# Patient Record
Sex: MALE | Race: BLACK OR AFRICAN AMERICAN | Employment: UNEMPLOYED | URBAN - METROPOLITAN AREA
[De-identification: names, ages, dates, MRNs, and addresses within clinical notes are randomized per-mention and may not be internally consistent; named-entity substitution may affect disease eponyms.]

---

## 2018-07-10 PROBLEM — I10 ESSENTIAL HYPERTENSION: Status: ACTIVE | Noted: 2018-07-10

## 2018-07-10 PROBLEM — R41.3 MEMORY DIFFICULTY: Status: ACTIVE | Noted: 2018-07-10

## 2018-07-10 PROBLEM — G47.33 OSA (OBSTRUCTIVE SLEEP APNEA): Status: ACTIVE | Noted: 2018-07-10

## 2018-07-10 PROBLEM — E78.00 PURE HYPERCHOLESTEROLEMIA: Status: ACTIVE | Noted: 2018-07-10

## 2020-05-19 PROBLEM — G20 MOTOR FLUCTUATIONS RELATED TO MEDICATION USE IN PARKINSON'S DISEASE (HCC): Status: ACTIVE | Noted: 2019-09-30

## 2020-05-19 PROBLEM — G20 PARKINSON DISEASE (HCC): Status: ACTIVE | Noted: 2019-02-07

## 2020-05-19 PROBLEM — T42.8X5A MOTOR FLUCTUATIONS RELATED TO MEDICATION USE IN PARKINSON'S DISEASE (HCC): Status: ACTIVE | Noted: 2019-09-30

## 2020-05-19 PROBLEM — G20.A2 MOTOR FLUCTUATIONS RELATED TO MEDICATION USE IN PARKINSON'S DISEASE (HCC): Status: ACTIVE | Noted: 2019-09-30

## 2020-05-19 PROBLEM — G20.A1 PARKINSON DISEASE: Status: ACTIVE | Noted: 2019-02-07

## 2020-05-20 ENCOUNTER — OFFICE VISIT (OUTPATIENT)
Dept: NEUROLOGY | Age: 60
End: 2020-05-20
Payer: COMMERCIAL

## 2020-05-20 VITALS
SYSTOLIC BLOOD PRESSURE: 142 MMHG | WEIGHT: 233 LBS | BODY MASS INDEX: 31.56 KG/M2 | RESPIRATION RATE: 18 BRPM | HEART RATE: 68 BPM | HEIGHT: 72 IN | DIASTOLIC BLOOD PRESSURE: 94 MMHG | TEMPERATURE: 97.1 F | OXYGEN SATURATION: 95 %

## 2020-05-20 PROCEDURE — 99205 OFFICE O/P NEW HI 60 MIN: CPT | Performed by: CLINICAL NURSE SPECIALIST

## 2020-06-11 RX ORDER — ROTIGOTINE 4 MG/24H
1 PATCH, EXTENDED RELEASE TRANSDERMAL DAILY
Qty: 28 PATCH | Refills: 0 | COMMUNITY
Start: 2020-06-11 | End: 2020-08-13

## 2020-07-29 ENCOUNTER — HOSPITAL ENCOUNTER (OUTPATIENT)
Dept: NEUROLOGY | Age: 60
Discharge: HOME | End: 2020-07-29
Payer: COMMERCIAL

## 2020-07-29 PROCEDURE — 95886 MUSC TEST DONE W/N TEST COMP: CPT

## 2020-07-29 PROCEDURE — 95886 MUSC TEST DONE W/N TEST COMP: CPT | Performed by: PHYSICAL MEDICINE & REHABILITATION

## 2020-07-29 PROCEDURE — 95912 NRV CNDJ TEST 11-12 STUDIES: CPT | Performed by: PHYSICAL MEDICINE & REHABILITATION

## 2020-07-29 PROCEDURE — 95912 NRV CNDJ TEST 11-12 STUDIES: CPT

## 2020-07-29 NOTE — PROCEDURES
[address] [facility]  [address], [address]  Phone: [phone]  Fax: [phone]      Referring Provider: JUAN F [facility]  Primary Care Physician: Marcia Alas MD  Patient Name: Kirstin Wade  Patient YOB: 1960  Gender: male  BMI: 31.6  H: 6'  W: 233lbs     7/29/2020    Description of clinical problem:   CC: foot numbness and pain     Patient presents with left worse than right foot numbness and pain. Admits to right leg weakness and back pain. Brief physical exam:   Sensory deficit No; Weakness No; Atrophy  No; Reflex abnormality Yes    Motor NCS      Nerve / Sites Lat. Amplitude Distance Velocity Temp. Amp. 1-2    ms mV cm m/s °C %   R Peroneal - EDB      Ankle 3.96 4.7 8  31.9 100      Fib head 11.61 3.3 30 39 31.9 48.7      Pop fossa 13.75 3.1 10 47 31.9 65.6   L Peroneal - EDB      Ankle 3.49 7.6 8  31.9 100      Fib head 11.88 7.6 30 36 31.9 100      Pop fossa 13.75 7.2 10 53 31.9 94.2   R Tibial - AH      Ankle 4.64 1.9 8  32 100      Pop fossa 14.06 1.9 44 47 32 103   L Tibial - AH      Ankle 4.48 6.2 8  31.7 100      Pop fossa 14.53 4.3 44 44 31.7 69.1       Sensory NCS      Nerve / Sites Peak Lat PP Amp Distance Peak Diff Velocity Temp.     ms µV cm ms m/s °C   R Superficial peroneal - Ankle      Lat leg 3.75 7.9 10  32 32   L Superficial peroneal - Ankle      Lat leg 3.80 7.9 10  32 31.9   R Sural - Ankle (Calf)      Calf 3.85 5.0 14  47 32   L Sural - Ankle (Calf)      Calf 4.11 3.9 14  53 31.7   L Medial plantar, Lateral plantar - Ankle (Medial, lateral sole)      Medial plantar Sole NR NR 14  NR 31.5      Lateral plantar Sole NR NR 14  NR 31.6       NR  31.6   R Medial plantar, Lateral plantar - Ankle (Medial, lateral sole)      Medial plantar Sole NR NR 14  NR 31.6      Lateral plantar Sole NR NR 14  NR 31.5       NR  31.5       F  Wave      Nerve F Lat M Lat F-M Lat    ms ms ms   R Peroneal - EDB 58.1 4.4 53.6   R H-reflex responses was prolonged. 5. EMG was performed with monopolar needle in multiple muscles outlined above, including the lumbar paraspinals. There were chronic neuropathic changes seen in the right L5 and S1 innervated muscles including the paraspinals on the left. There was decreased recruitment in the left medial gastrocnemius. All other muscles tested revealed normal insertional activity, without evidence of abnormal spontaneous activity. All MUAP's were of normal amplitude and duration with a full recruitment pattern. No increase in polyphasic potentials was noted. Diagnostic Interpretation: This study was abnormal.     1. 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 It is chronic in duration, moderate in severity. Prognosis for axonal lesions is poor. 2. Bilateral medial and lateral plantar responses were absent. These responses are technically difficult to obtain in individuals and may be absent in older individuals and individuals with flat feet. Correlate clinically. Recommend further work up and correlation with lumbar MRI. Previous Study: none       Follow up EMG is recommended if clinically indicated. Technologist: Cornel Cheng     Physician: Michael Li DO, [facility]   Board Certified Physical Medicine and Rehabilitation      Nerve conduction studies and electromyography were performed according to our laboratory policies and procedures which can be provided upon request. All abnormal values are identified in the table.  Laboratory normal values can also be provided upon request.       Cc: MALLY Warner MD

## 2020-08-04 ENCOUNTER — TELEPHONE (OUTPATIENT)
Dept: PHYSICAL MEDICINE AND REHAB | Age: 60
End: 2020-08-04

## 2020-08-04 NOTE — TELEPHONE ENCOUNTER
Called and spoke with Emma to inform her that she is to follow up with the physician that referred her for the EMG test.  Emma stated that he did follow up with the foot doctor and they said to call us or his PCP. Emma then stated that she called his PCP and they told him that he would follow up with the foot doctor.   Emma was informed that if they want him to be seen by Dr Charmaine Nina he would need a new referral since the original referral was just for the EMG test.

## 2020-08-04 NOTE — TELEPHONE ENCOUNTER
Pt's wife called in she stated the pt has received his report from his EMG, he sent it to his PCP, foot doctor and his neurologist, she wants to know what the next step is. She is not sure if Dr. Stephen Moran needs to see him or if he just needs to follow up with the neurologist.Emma can be reached at [phone] Please, advise. Thank you.

## 2020-08-12 NOTE — PROGRESS NOTES
Bayron Finch is a 61 y.o. right handed man    Patient was referred for a history of Parkinson's disease. States he was diagnosed 2 years ago -- found to be \"walking stiff\" by a  and went to see his Dr who referred him to [facility].   There, he was dx with idiopathic Parkinson's disease and started on Mirapex  He titrated with little effect -- in fact, it made him tired, irritable and did not seem to help his stiffness nor his tremor     They switched him to Azilect 1mg daily and he feels it is better but not much  He feels it works better than Mirapex but still stiff, and still with tremor    We tried Neupro 2mg then 4mg   He does note improvement in swinging his arms when he walks   He also notes his golf game seems better   Asking if there is something else because he wants to be better    We discussed carbidopa/levadopa -- we also discussed higher doses of Neupro     No falls   No trouble chewing or swallowing    Imaging reviewed from a few years ago     No headache  No stroke or seizure hx       Objective:   /83 (Site: Right Upper Arm, Position: Sitting, Cuff Size: Large Adult)   Pulse 76   Temp 97.9 °F (36.6 °C)   Resp 20   Ht 6' (1.829 m)   Wt 230 lb (104.3 kg)   SpO2 95%   BMI 31.19 kg/m²      General appearance: alert, appears stated age and cooperative  Head: Normocephalic, without obvious abnormality, atraumatic  Neck: limited ROM  Extremities: no cyanosis or edema  Pulses: 2+ and symmetric  Skin: no rashes or lesions     Mental Status: Alert, oriented to person place and year     Speech: clear  Language: appropriate     Mask-like facies  No Myerson's sign     Cranial Nerves:  I: smell    II: visual acuity     II: visual fields Full    II: pupils ESTELA   III,VII: ptosis None   III,IV,VI: extraocular muscles  EOMI without nystagmus - coarse saccadic pursuits    V: mastication Normal   V: facial light touch sensation     V,VII: corneal reflex  Present   VII: facial muscle function - upper     VII: facial muscle function - lower Normal   VIII: hearing Normal   IX: soft palate elevation  Normal   IX,X: gag reflex Present   XI: trapezius strength  5/5   XI: sternocleidomastoid strength 5/5   XI: neck extension strength  5/5   XII: tongue strength  Normal     Motor:  5/5 throughout  Normal bulk and tone     Marked bradykinesia  No resting tremor right hand today  Cogwheel rigidity noted right hand    No tremor with outstretched hands  No ataxic tremor     Sensory:  LT normal  Vibration normal     Coordination:   FN, FFM and CARLEY normal    Gait:  Normal   Moderate Fenelton's  Marked bradykinesia     DTR:   No reflexes    No Victoria's     Laboratory/Radiology:     Recent labs per PCP -- none to personally review    MRI Brain 2009  Unremarkable     I independently reviewed the labs and imaging studies today. Assessment:     Idiopathic Parkinson's disease   No help with Mirapex and minimal help with Azilect 1mg daily   [facility] recommended holding off with Sinemet and I agreed last appt given his age but requests to try something different given his limited response to Mirapex, Neupro and now Azilect     Plan:      Will try small dose Sinemet   Stop Neupro     Continue Azilect for now     Increase exercise discussed     RTO 2 months but will call again in 2 weeks     Garett Palma  4:17 PM  8/13/2020

## 2020-08-13 ENCOUNTER — OFFICE VISIT (OUTPATIENT)
Dept: NEUROLOGY | Age: 60
End: 2020-08-13
Payer: MEDICAID

## 2020-08-13 VITALS
RESPIRATION RATE: 20 BRPM | WEIGHT: 230 LBS | OXYGEN SATURATION: 95 % | SYSTOLIC BLOOD PRESSURE: 131 MMHG | HEIGHT: 72 IN | TEMPERATURE: 97.9 F | DIASTOLIC BLOOD PRESSURE: 83 MMHG | BODY MASS INDEX: 31.15 KG/M2 | HEART RATE: 76 BPM

## 2020-08-13 PROCEDURE — G8417 CALC BMI ABV UP PARAM F/U: HCPCS | Performed by: CLINICAL NURSE SPECIALIST

## 2020-08-13 PROCEDURE — G8427 DOCREV CUR MEDS BY ELIG CLIN: HCPCS | Performed by: CLINICAL NURSE SPECIALIST

## 2020-08-13 PROCEDURE — 1036F TOBACCO NON-USER: CPT | Performed by: CLINICAL NURSE SPECIALIST

## 2020-08-13 PROCEDURE — 99214 OFFICE O/P EST MOD 30 MIN: CPT | Performed by: CLINICAL NURSE SPECIALIST

## 2020-08-13 PROCEDURE — 3017F COLORECTAL CA SCREEN DOC REV: CPT | Performed by: CLINICAL NURSE SPECIALIST

## 2020-09-16 ENCOUNTER — TELEPHONE (OUTPATIENT)
Dept: NEUROLOGY | Age: 60
End: 2020-09-16

## 2020-09-16 NOTE — TELEPHONE ENCOUNTER
Spoke with  and notified him to hold his carbidopa-levodopa for 1 week and call office at that time with update.

## 2020-09-16 NOTE — TELEPHONE ENCOUNTER
Wife, Debra Kasper called concerned that her  has lost 15 pounds since he was starting on carbidopa-levodopa 25-100mg bid. States his appetite is normal.  Please advise.

## 2020-09-21 ENCOUNTER — TELEPHONE (OUTPATIENT)
Dept: NEUROLOGY | Age: 60
End: 2020-09-21

## 2020-09-21 NOTE — TELEPHONE ENCOUNTER
Wife called in stating that Rasagiline Mesylate co pay is $450 for Qty: 30.  Is there anything else they can try.   Electronically signed by Kimber Grant on 9/21/20 at 1:45 PM EDT

## 2020-09-22 NOTE — TELEPHONE ENCOUNTER
Weight has maintained since stopping the Sinement  mg 1 week ago, should we start up again and if possible wife is asking can he take the Neupro at a higher dose from 4 mg to 8 mg.    Please Advise

## 2020-09-23 RX ORDER — ROTIGOTINE 8 MG/24H
8 PATCH, EXTENDED RELEASE TRANSDERMAL DAILY
Qty: 30 PATCH | Refills: 1 | Status: SHIPPED
Start: 2020-09-23 | End: 2020-09-28 | Stop reason: SDUPTHER

## 2020-09-24 NOTE — TELEPHONE ENCOUNTER
Patient's wife called in asking what was going on with the patient's prescription for Neupro patches. Per [facility] we are waiting for prior authorization and we will call once we know something.

## 2020-09-25 ENCOUNTER — TELEPHONE (OUTPATIENT)
Dept: NEUROLOGY | Age: 60
End: 2020-09-25

## 2020-09-25 NOTE — TELEPHONE ENCOUNTER
Iraj Approval of Neupro 8 mg - PA# [employer] [id number] MM,  Valid 9/25/2020 - 12/25/2020.   In Media  ,Electronically signed by Mitali Lutz on 9/25/20 at 1:57 PM EDT

## 2020-09-28 ENCOUNTER — TELEPHONE (OUTPATIENT)
Dept: NEUROLOGY | Age: 60
End: 2020-09-28

## 2020-09-28 RX ORDER — ROTIGOTINE 8 MG/24H
8 PATCH, EXTENDED RELEASE TRANSDERMAL DAILY
Qty: 42 PATCH | Refills: 0 | COMMUNITY
Start: 2020-09-28 | End: 2020-11-11

## 2020-09-28 NOTE — TELEPHONE ENCOUNTER
Wife called in to clarify that patient is too stop taking Sinemet, add 8 mg of Neupro. (Samples in [facility]). Also is requesting a referral to Chiropractor for his back pain due to his Parkinson's the results were from patient's EMG.  Please Advise  Electronically signed by Jose M Christopher on 9/28/20 at 10:15 AM EDT

## 2020-10-20 RX ORDER — ROTIGOTINE 4 MG/24H
2 PATCH, EXTENDED RELEASE TRANSDERMAL DAILY
Qty: 42 PATCH | Refills: 0 | COMMUNITY
Start: 2020-10-20 | End: 2020-11-11 | Stop reason: SDUPTHER

## 2020-11-11 ENCOUNTER — OFFICE VISIT (OUTPATIENT)
Dept: NEUROLOGY | Age: 60
End: 2020-11-11
Payer: COMMERCIAL

## 2020-11-11 VITALS
HEIGHT: 72 IN | WEIGHT: 235 LBS | HEART RATE: 65 BPM | OXYGEN SATURATION: 94 % | RESPIRATION RATE: 20 BRPM | TEMPERATURE: 97.6 F | BODY MASS INDEX: 31.83 KG/M2 | DIASTOLIC BLOOD PRESSURE: 88 MMHG | SYSTOLIC BLOOD PRESSURE: 140 MMHG

## 2020-11-11 PROCEDURE — G8427 DOCREV CUR MEDS BY ELIG CLIN: HCPCS | Performed by: CLINICAL NURSE SPECIALIST

## 2020-11-11 PROCEDURE — 99214 OFFICE O/P EST MOD 30 MIN: CPT | Performed by: CLINICAL NURSE SPECIALIST

## 2020-11-11 PROCEDURE — G8484 FLU IMMUNIZE NO ADMIN: HCPCS | Performed by: CLINICAL NURSE SPECIALIST

## 2020-11-11 PROCEDURE — G8417 CALC BMI ABV UP PARAM F/U: HCPCS | Performed by: CLINICAL NURSE SPECIALIST

## 2020-11-11 PROCEDURE — 3017F COLORECTAL CA SCREEN DOC REV: CPT | Performed by: CLINICAL NURSE SPECIALIST

## 2020-11-11 PROCEDURE — 1036F TOBACCO NON-USER: CPT | Performed by: CLINICAL NURSE SPECIALIST

## 2020-11-11 RX ORDER — ROTIGOTINE 4 MG/24H
2 PATCH, EXTENDED RELEASE TRANSDERMAL DAILY
Qty: 42 PATCH | Refills: 0 | Status: SHIPPED
Start: 2020-11-11 | End: 2021-01-18

## 2020-11-11 NOTE — PROGRESS NOTES
Emily Tapia is a 61 y.o. right handed man    Patient was referred for a history of Parkinson's disease. States he was diagnosed 2-3 years ago -- found to be \"walking stiff\" by a  and went to see his Dr who referred him to [facility].   There, he was dx with idiopathic Parkinson's disease and started on Mirapex  He titrated with little effect -- in fact, it made him tired, irritable and did not seem to help his stiffness nor his tremor     They switched him to Azilect 1mg daily and he felt it was better \"but not much\"  He feels it works better than Mirapex but still stiff, and still with tremor    We tried Neupro titrating to 8mg    He did note improvement in swinging his arms when he walks   He also notes his golf game seems better    At our last appt he asked if there was something better and we discussed Sinemet but were hesitant d/t his age -- he reported [facility] felt the same   But, given his s/s and complaints, we decided to try a small dose of Sinemet just with breakfast and dinner     His wife called the week after with notes of weight loss -- he was losing 5# per week   He tells me that he was not upset with this (\"Ineeded to lose weight\") but states his wife was worried   We held the Sinemet and kept him just on Neupro 8mg daily     He is here today states he remains stiff but does feel overall better since Neupro addition   He is asking to restart Sinemet but I remained hesitant d/t ill effects    He is asking for a \"boost\" in the afternoon because he is always slower in the afternoons and has a loss in sex drive     No falls   No trouble chewing or swallowing  Imaging reviewed from a few years ago     No headache  No stroke or seizure hx     No chest pain or palpitations  No SOB  No vertigo, lightheadedness or loss of consciousness  No falls, tripping or stumbling  No incontinence of bowels or bladder  No itching or bruising appreciated  No numbness, tingling or focal arm/leg weakness    ROS otherwise negative     Objective:   BP (!) 140/88 (Site: Right Upper Arm, Position: Sitting, Cuff Size: Large Adult)   Pulse 65   Temp 97.6 °F (36.4 °C)   Resp 20   Ht 6' (1.829 m)   Wt 235 lb (106.6 kg)   SpO2 94%   BMI 31.87 kg/m²      General appearance: alert, appears stated age and cooperative  Head: Normocephalic, without obvious abnormality, atraumatic  Neck: limited ROM  Extremities: no cyanosis or edema  Pulses: 2+ and symmetric  Skin: no rashes or lesions     Mental Status: Alert, oriented to person place and year     Speech: clear  Language: appropriate     Mask-like facies  No Myerson's sign     Cranial Nerves:  I: smell    II: visual acuity     II: visual fields Full    II: pupils ESTELA   III,VII: ptosis None   III,IV,VI: extraocular muscles  EOMI without nystagmus - coarse saccadic pursuits    V: mastication Normal   V: facial light touch sensation     V,VII: corneal reflex  Present   VII: facial muscle function - upper     VII: facial muscle function - lower Normal   VIII: hearing Normal   IX: soft palate elevation  Normal   IX,X: gag reflex    XI: trapezius strength  5/5   XI: sternocleidomastoid strength 5/5   XI: neck extension strength  5/5   XII: tongue strength  Normal     Motor:  5/5 throughout  Normal bulk and tone     Marked bradykinesia  No resting tremor right hand today  Cogwheel rigidity noted in elbows and wrists bilaterally     No tremor with outstretched hands  No ataxic tremor     Sensory:  LT normal  Vibration normal     Coordination:   FN, FFM and CARLEY normal    Gait:  Normal   Moderate Mesilla's  Marked bradykinesia     DTR:   No reflexes    No Victoria's     Laboratory/Radiology:     Recent labs per PCP -- none to personally review    MRI Brain 2009  Unremarkable     I independently reviewed the labs and imaging studies today.      Assessment:     Idiopathic Parkinson's disease   No help with Mirapex and minimal help with Azilect 1mg daily   [facility] recommended holding off with Sinemet and I agreed given his age but needed to try something different given his limited response to Mirapex, Neupro and Azilect     Plan:      Will try small dose Sinemet in the early afternoon to hopefully help his complaints    We did discuss Rytary as a possibility   We even discussed DBS and Sinemet pump  Which he and I remain hesitant to refer for this     Increase exercise discussed     Will follow with Dr Giancarlo Gambino for alternative ideas for tx options     Abe Chowdary  9:57 AM  11/11/2020

## 2020-11-12 ENCOUNTER — OFFICE VISIT (OUTPATIENT)
Dept: NEUROLOGY | Age: 60
End: 2020-11-12
Payer: COMMERCIAL

## 2020-11-12 VITALS
SYSTOLIC BLOOD PRESSURE: 136 MMHG | WEIGHT: 236 LBS | OXYGEN SATURATION: 100 % | HEART RATE: 71 BPM | DIASTOLIC BLOOD PRESSURE: 89 MMHG | RESPIRATION RATE: 16 BRPM | TEMPERATURE: 97.3 F | HEIGHT: 72 IN | BODY MASS INDEX: 31.97 KG/M2

## 2020-11-12 PROCEDURE — 99215 OFFICE O/P EST HI 40 MIN: CPT | Performed by: PSYCHIATRY & NEUROLOGY

## 2020-11-12 PROCEDURE — 1036F TOBACCO NON-USER: CPT | Performed by: PSYCHIATRY & NEUROLOGY

## 2020-11-12 PROCEDURE — 3017F COLORECTAL CA SCREEN DOC REV: CPT | Performed by: PSYCHIATRY & NEUROLOGY

## 2020-11-12 PROCEDURE — G8427 DOCREV CUR MEDS BY ELIG CLIN: HCPCS | Performed by: PSYCHIATRY & NEUROLOGY

## 2020-11-12 PROCEDURE — G8417 CALC BMI ABV UP PARAM F/U: HCPCS | Performed by: PSYCHIATRY & NEUROLOGY

## 2020-11-12 PROCEDURE — G8484 FLU IMMUNIZE NO ADMIN: HCPCS | Performed by: PSYCHIATRY & NEUROLOGY

## 2020-11-12 ASSESSMENT — ENCOUNTER SYMPTOMS
SHORTNESS OF BREATH: 0
TROUBLE SWALLOWING: 0
VOMITING: 0
PHOTOPHOBIA: 0
NAUSEA: 0

## 2020-11-12 NOTE — PROGRESS NOTES
hallucinations  delusions: No    Sleep:  insomnia: No  excessive daytime sleepiness: No  REM sleep behavior disorder: Yes  Restless legs or PLMS: No    Parkinson's Medications Schedule: Neupro patch, 8 mg for 24 hours. Sinemet 25/100, 1 tablet daily. Previous Parkinson's Medications: Mirapex did not help. He was on Azilect in the past however he is currently not taking it for unclear reasons.     CT head 11/2019: No acute abnormality    PAST MEDICAL HISTORY:   Past Medical History:   Diagnosis Date    Hypertension     REM behavioral disorder      PAST SURGICAL HISTORY:   Past Surgical History:   Procedure Laterality Date    FOOT SURGERY Bilateral      FAMILY MEDICAL HISTORY:   Family History   Problem Relation Age of Onset    Hypertension Mother     Heart Disease Father      SOCIAL HISTORY:   Social History     Socioeconomic History    Marital status:      Spouse name: None    Number of children: None    Years of education: None    Highest education level: None   Occupational History    Occupation:    Social Needs    Financial resource strain: None    Food insecurity     Worry: None     Inability: None    Transportation needs     Medical: None     Non-medical: None   Tobacco Use    Smoking status: Never Smoker    Smokeless tobacco: Never Used   Substance and Sexual Activity    Alcohol use: No    Drug use: No    Sexual activity: Not Currently   Lifestyle    Physical activity     Days per week: None     Minutes per session: None    Stress: None   Relationships    Social connections     Talks on phone: None     Gets together: None     Attends [employer] service: None     Active member of club or organization: None     Attends meetings of clubs or organizations: None     Relationship status: None    Intimate partner violence     Fear of current or ex partner: None     Emotionally abused: None     Physically abused: None     Forced sexual activity: None   Other Topics Concern  None   Social History Narrative    None      E-Cigarettes Vaping or Juuling     Questions Responses    Vaping Use Never User    Start Date     Does device contain nicotine? Never    Quit Date     Vaping Type          Allergy: No Known Allergies  MEDS:   Current Outpatient Medications:     carbidopa-levodopa (SINEMET)  MG per tablet, Take 1 tablet by mouth daily Once daily at noon, Disp: 30 tablet, Rfl: 2    rotigotine (NEUPRO) 4 MG/24HR, Place 2 patches onto the skin daily, Disp: 42 patch, Rfl: 0    meloxicam (MOBIC) 15 MG tablet, Take 15 mg by mouth daily, Disp: , Rfl:     rasagiline mesylate 1 MG TABS, Take 1 mg by mouth daily, Disp: , Rfl:     hydrochlorothiazide (HYDRODIURIL) 25 MG tablet, TK 1 T PO QD, Disp: , Rfl: 1    metoprolol tartrate (LOPRESSOR) 25 MG tablet, TK 1 T PO QD, Disp: , Rfl: 5    simvastatin (ZOCOR) 40 MG tablet, TK 1 T PO D, Disp: , Rfl: 1    REVIEW OF SYSTEMS  Review of Systems   Constitutional: Negative for appetite change, fatigue and unexpected weight change. HENT: Negative for drooling, hearing loss, tinnitus and trouble swallowing. Eyes: Negative for photophobia and visual disturbance. Respiratory: Negative for shortness of breath. Cardiovascular: Negative for palpitations. Gastrointestinal: Negative for nausea and vomiting. Endocrine: Negative for polyuria. Genitourinary: Negative for flank pain. Musculoskeletal: Positive for gait problem. Negative for neck pain and neck stiffness. Skin: Negative for rash. Allergic/Immunologic: Negative for food allergies. Neurological: Negative for dizziness, tremors, seizures, syncope, speech difficulty, weakness, light-headedness, numbness and headaches. Hematological: Negative for adenopathy. Psychiatric/Behavioral: Negative for agitation, behavioral problems and sleep disturbance.          PHYSICAL EXAM:   /89   Pulse 71   Temp 97.3 °F (36.3 °C) (Temporal)   Resp 16   Ht 6' (1.829 m)   Wt 236 lb (107 kg)   SpO2 100%   BMI 32.01 kg/m²   GENERAL APPEARANCE: Alert, well-developed, well-nourished male in no acute distress. HEENT: Normocephalic and atraumatic. PERRL. Oropharynx unremarkable. PULM: Normal respiratory effort. No accessory muscle use. CV: RRR. ABDOMEN: Soft, nontender. EXTREMITIES: No obvious signs of vascular compromise. Pulses present. No cyanosis, clubbing or edema. SKIN: Clear; no rashes, lesions or skin breaks in exposed areas. NEURO:     Neurological examination     MENTAL STATUS: Patient awake and oriented to time, place, and person. Recent/remote memory normal. Attention span/concentration normal. Speech fluent. Good comprehension, naming, and repetition. Fund of knowledge appropriate for patient's level of education. Affect normal.    CRANIAL NERVES:  CN I: Not tested. CN II: Fundoscopic exam not performed. CN III, IV, VI: Pupils equal, round and reactive to light; extra ocular movements full and intact. CN V: Facial sensation normal.  CN VII: No facial asymmetry. CN VIII:  Hearing grossly normal bilaterally. No pathologic nystagmus or skew deviation. CN IX, X: Palate elevates symmetrically. CN XI: Shoulder shrug and chin rotation equal with intact strength. CN XII: Tongue protrusion midline. MOTOR: Normal bulk. Tone in bilateral upper and lower extremities. Strength 5/5 throughout. ABNORMAL MOVEMENTS/TREMORS: No     REFLEXES: DTRs 2+; normal and symmetric throughout. Plantar response downgoing. SENSATION: Sensation grossly intact to fine touch, pain/temperature, vibration and position. COORDINATION: Finger-to-nose and heel to shin normal for age and symmetric. Finger tapping and alternating movements normal.    STATION: Negative Romberg. GAIT: Walks with a stiff gait, decreased arm swing stooped posture.     DIAGNOSTIC TESTS:     I have personally reviewed the most recent lab results:    Sodium   Date Value Ref Range Status   12/22/2010 140 132 - 146 mmol/L Final     Potassium   Date Value Ref Range Status   12/22/2010 4.2 3.5 - 5.5 mmol/L Final     Chloride   Date Value Ref Range Status   12/22/2010 104 99 - 109 mmol/L Final     CO2   Date Value Ref Range Status   12/22/2010 28 20 - 31 mmol/L Final     BUN   Date Value Ref Range Status   12/22/2010 20 6 - 20 mg/dL Final     Creatinine   Date Value Ref Range Status   12/22/2010 1.2 0.7 - 1.3 mg/dL Final     Gfr Calculated   Date Value Ref Range Status   12/22/2010 >60 >=60 ml/mn/1.73 Final     Comment:     Chronic Kidney Disease- less than 60 ml/min/1.73 sq.m. Kidney Failure- less than 15 ml/min/1.73 sq.m. Calcium   Date Value Ref Range Status   12/22/2010 9.2 8.6 - 10.5 mg/dL Final     WBC   Date Value Ref Range Status   12/22/2010 6.8 4.5 - 11.5 E9/L Final     HGB   Date Value Ref Range Status   12/22/2010 15.2 12.5 - 16.5 g/dL Final     HCT   Date Value Ref Range Status   12/22/2010 44.6 37.0 - 54.0 % Final     Platelets   Date Value Ref Range Status   12/22/2010 169 130 - 450 E9/L Final     Total Protein   Date Value Ref Range Status   12/22/2010 7.1 5.7 - 8.2 g/dL Final     Bilirubin, Total   Date Value Ref Range Status   12/22/2010 0.5 0.3 - 1.2 mg/dL Final     Alkaline Phosphatase   Date Value Ref Range Status   12/22/2010 78 45 - 129 U/L Final     ALT   Date Value Ref Range Status   12/22/2010 33 10 - 49 U/L Final     AST   Date Value Ref Range Status   12/22/2010 25 0 - 33 U/L Final     No results found for: PTT, INR  Lab Results   Component Value Date    TRIG 325 (H) 12/22/2010    HDL 40.0 (A) 12/22/2010     No components found for: HGBA1C  No results found for: PROTEINCSF, GLUCCSF, WBCCSF    Controlled Substance Monitoring:    Acute and Chronic Pain Monitoring:   No flowsheet data found.     MEDICAL DECISION MAKING  ASSESSMENT/PLAN    Parkinson's disease     This is a 80-year-old male with symptoms of stiffness, rigidity, tremors, symptoms discovered in 2018, diagnosed with Parkinson's disease. Currently the patient still continues to be stiff in bilateral upper and lower extremities, unsteady and off balance. He has tried multiple levodopa carbidopa sparing medications in the past without much significant relief. At this time I recommend maximizing the levodopa carbidopa therapy and watch for any side effects. Currently he is taking 25/100,  once a day. Consider increasing to 3 times a day. Titrate carbidopa levodopa, short acting and long-acting forms to maximize efficacy. Okay to continue Neupro at this time. However if he has side effects consider decreasing Neupro. The patient has been off of Azilect. This can be retried if there is no response to levodopa carbidopa therapy. Follow-up with Shellie Rico    Thank you for involving me in the care of your patient. Today, I personally spent > 40 mins directly face-to-face time with the patient, of which greater than 50% was spent in patient education, counseling,about etiology management and diagnosis of Parkinson's disease. Side effects of medications including carbidopa, levodopa, Azilect, Neupro, Mirapex were discussed in detail with the patient, verbalizes understanding and agrees to it. And coordination of care as described above. Patient's current medication list, allergies, problem list and results of all previously ordered testing and scans were reviewed at today's visit.       Bruna Orta MD    [facility]  [address] [place] [address]

## 2021-01-18 ENCOUNTER — OFFICE VISIT (OUTPATIENT)
Dept: NEUROLOGY | Age: 61
End: 2021-01-18
Payer: MEDICAID

## 2021-01-18 VITALS
HEIGHT: 72 IN | SYSTOLIC BLOOD PRESSURE: 136 MMHG | DIASTOLIC BLOOD PRESSURE: 87 MMHG | BODY MASS INDEX: 31.83 KG/M2 | RESPIRATION RATE: 20 BRPM | TEMPERATURE: 97 F | OXYGEN SATURATION: 98 % | WEIGHT: 235 LBS | HEART RATE: 73 BPM

## 2021-01-18 DIAGNOSIS — G20 PARKINSON'S DISEASE (HCC): Primary | ICD-10-CM

## 2021-01-18 PROCEDURE — 3017F COLORECTAL CA SCREEN DOC REV: CPT | Performed by: CLINICAL NURSE SPECIALIST

## 2021-01-18 PROCEDURE — G8417 CALC BMI ABV UP PARAM F/U: HCPCS | Performed by: CLINICAL NURSE SPECIALIST

## 2021-01-18 PROCEDURE — G8427 DOCREV CUR MEDS BY ELIG CLIN: HCPCS | Performed by: CLINICAL NURSE SPECIALIST

## 2021-01-18 PROCEDURE — 99214 OFFICE O/P EST MOD 30 MIN: CPT | Performed by: CLINICAL NURSE SPECIALIST

## 2021-01-18 PROCEDURE — 1036F TOBACCO NON-USER: CPT | Performed by: CLINICAL NURSE SPECIALIST

## 2021-01-18 PROCEDURE — G8484 FLU IMMUNIZE NO ADMIN: HCPCS | Performed by: CLINICAL NURSE SPECIALIST

## 2021-01-18 RX ORDER — ROTIGOTINE 8 MG/24H
8 PATCH, EXTENDED RELEASE TRANSDERMAL DAILY
Qty: 30 PATCH | Refills: 5 | Status: SHIPPED
Start: 2021-01-18 | End: 2021-02-24 | Stop reason: DRUGHIGH

## 2021-01-18 NOTE — PROGRESS NOTES
Chayo Dodge is a 61 y.o. right handed man    Patient was referred for a history of Parkinson's disease. States he was diagnosed 2-3 years ago -- found to be \"walking stiff\" by a  and went to see his Dr who referred him to [facility].   There, he was dx with idiopathic Parkinson's disease and started on Mirapex  He titrated with little effect -- in fact, it made him tired, irritable and did not seem to help his stiffness nor his tremor     They switched him to Azilect 1mg daily and he felt it was better \"but not much\"  He feels it works better than Mirapex but still stiff, and still with tremor    We tried Neupro titrating to 8mg    He did note improvement in swinging his arms when he walks   He also notes his golf game seemed better    At a previous appt, he asked if there was something better and we discussed Sinemet but were hesitant d/t his age -- he reported [facility] felt the same   But, given his s/s and complaints, we decided to try a small dose of Sinemet just with breakfast and dinner     His wife called the week after with notes of weight loss -- he was losing 5# per week   He tells me that he was not upset with this (\"I needed to lose weight\") but states his wife was worried   We held the Sinemet and kept him just on Neupro 8mg daily     He asked to restart Sinemet but I remained hesitant d/t ill effects and his young age   However we did agree to try once daily and have him follow with Dr Ridge Gama for possible alternatives    He agreed with Sinemet and felt the dose should be titrated     No falls   No trouble chewing or swallowing  Imaging reviewed from a few years ago     No headache  No stroke or seizure hx     No chest pain or palpitations  No SOB  No vertigo, lightheadedness or loss of consciousness  No falls, tripping or stumbling  No incontinence of bowels or bladder  No itching or bruising appreciated  No numbness, tingling or focal arm/leg weakness    ROS otherwise negative     Objective:   BP 136/87 (Site: Right Upper Arm, Position: Sitting, Cuff Size: Medium Adult)   Pulse 73   Temp 97 °F (36.1 °C)   Resp 20   Ht 6' (1.829 m)   Wt 235 lb (106.6 kg)   SpO2 98%   BMI 31.87 kg/m²      General appearance: alert, appears stated age and cooperative  Head: Normocephalic, without obvious abnormality, atraumatic  Neck: limited ROM  Extremities: no cyanosis or edema  Pulses: 2+ and symmetric  Skin: no rashes or lesions     Mental Status: Alert, oriented to person place and year     Speech: clear  Language: appropriate     Mask-like facies  No Myerson's sign     Cranial Nerves:  I: smell    II: visual acuity     II: visual fields Full    II: pupils ESTELA   III,VII: ptosis None   III,IV,VI: extraocular muscles  EOMI without nystagmus - coarse saccadic pursuits    V: mastication Normal   V: facial light touch sensation     V,VII: corneal reflex  Present   VII: facial muscle function - upper     VII: facial muscle function - lower Normal   VIII: hearing Normal   IX: soft palate elevation  Normal   IX,X: gag reflex    XI: trapezius strength  5/5   XI: sternocleidomastoid strength 5/5   XI: neck extension strength  5/5   XII: tongue strength  Normal     Motor:  5/5 throughout  Normal bulk and tone     Marked bradykinesia  No resting tremor right hand today  Cogwheel rigidity noted in elbows and wrists bilaterally     No tremor with outstretched hands  No ataxic tremor     Sensory:  LT normal  Vibration normal     Coordination:   FN, FFM and CARLEY normal    Gait:  Normal   Moderate Aliyah's  Marked bradykinesia     DTR:   No reflexes    No Victoria's     Laboratory/Radiology:     Recent labs per PCP -- none to personally review    MRI Brain 2009  Unremarkable     I independently reviewed the labs and imaging studies today.      Assessment:     Idiopathic Parkinson's disease   No help with Mirapex and minimal help with Azilect 1mg daily   [facility] recommended holding off with Sinemet and I agreed given his age but needed to try something different given his limited response to Mirapex, Neupro and Azilect     Plan:      Will try small increase of Sinemet - am and with dinner    He will start exercising more     We did discuss Rytary as a possibility     Earlene Drake  12:14 PM  1/18/2021

## 2021-01-22 ENCOUNTER — TELEPHONE (OUTPATIENT)
Dept: NEUROLOGY | Age: 61
End: 2021-01-22

## 2021-01-22 NOTE — TELEPHONE ENCOUNTER
Iraj Approval of Neupro 8 mg - [id number] TR, Valid 1/21/2021 - 01/21/2022.   In Media  Electronically signed by Joie Mcginnis on 1/22/21 at 7:31 AM EST

## 2021-02-24 ENCOUNTER — VIRTUAL VISIT (OUTPATIENT)
Dept: NEUROLOGY | Age: 61
End: 2021-02-24
Payer: MEDICAID

## 2021-02-24 DIAGNOSIS — G20 PARKINSON'S DISEASE (HCC): Primary | ICD-10-CM

## 2021-02-24 DIAGNOSIS — N31.9 BLADDER DYSFUNCTION: ICD-10-CM

## 2021-02-24 PROCEDURE — 99443 PR PHYS/QHP TELEPHONE EVALUATION 21-30 MIN: CPT | Performed by: CLINICAL NURSE SPECIALIST

## 2021-02-24 RX ORDER — ROTIGOTINE 6 MG/24H
1 PATCH, EXTENDED RELEASE TRANSDERMAL DAILY
Qty: 70 PATCH | Refills: 0 | COMMUNITY
Start: 2021-02-24 | End: 2021-03-19 | Stop reason: DRUGHIGH

## 2021-02-24 NOTE — PROGRESS NOTES
Rachel Higginbotham was read the following message We want to confirm that, for purposes of billing, this is a virtual visit with your provider for which we will submit a claim for reimbursement with your insurance company. You will be responsible for any copays, coinsurance amounts or other amounts not covered by your insurance company. If you do not accept this, unfortunately we will not be able to schedule or proceed with a virtual visit with the provider. Do you accept? Ottoniel Agee responded Yes .

## 2021-03-09 ENCOUNTER — TELEPHONE (OUTPATIENT)
Dept: NEUROLOGY | Age: 61
End: 2021-03-09

## 2021-03-09 NOTE — TELEPHONE ENCOUNTER
Pt called stating that he went to see his Urologist and he prescribed him generic Cialis. He wanted to check with Yulia Vizcarra if that is ok.  Please advise

## 2021-03-19 RX ORDER — ROTIGOTINE 4 MG/24H
2 PATCH, EXTENDED RELEASE TRANSDERMAL DAILY
Qty: 42 PATCH | Refills: 0 | COMMUNITY
Start: 2021-03-19 | End: 2021-04-28 | Stop reason: SDUPTHER

## 2021-03-19 RX ORDER — ROTIGOTINE 4 MG/24H
1 PATCH, EXTENDED RELEASE TRANSDERMAL DAILY
Qty: 14 PATCH | Refills: 0 | COMMUNITY
Start: 2021-03-19 | End: 2021-03-19 | Stop reason: SDUPTHER

## 2021-04-14 ENCOUNTER — OFFICE VISIT (OUTPATIENT)
Dept: FAMILY MEDICINE CLINIC | Age: 61
End: 2021-04-14
Payer: COMMERCIAL

## 2021-04-14 VITALS
RESPIRATION RATE: 18 BRPM | OXYGEN SATURATION: 97 % | WEIGHT: 242 LBS | DIASTOLIC BLOOD PRESSURE: 84 MMHG | BODY MASS INDEX: 32.78 KG/M2 | TEMPERATURE: 97.8 F | SYSTOLIC BLOOD PRESSURE: 122 MMHG | HEIGHT: 72 IN | HEART RATE: 72 BPM

## 2021-04-14 DIAGNOSIS — G58.9 PINCHED NERVE: ICD-10-CM

## 2021-04-14 DIAGNOSIS — G20 PARKINSON DISEASE (HCC): ICD-10-CM

## 2021-04-14 DIAGNOSIS — R07.9 CHEST PAIN, UNSPECIFIED TYPE: Primary | ICD-10-CM

## 2021-04-14 PROCEDURE — 99204 OFFICE O/P NEW MOD 45 MIN: CPT | Performed by: FAMILY MEDICINE

## 2021-04-14 PROCEDURE — 93000 ELECTROCARDIOGRAM COMPLETE: CPT | Performed by: FAMILY MEDICINE

## 2021-04-14 PROCEDURE — G8427 DOCREV CUR MEDS BY ELIG CLIN: HCPCS | Performed by: FAMILY MEDICINE

## 2021-04-14 PROCEDURE — 3017F COLORECTAL CA SCREEN DOC REV: CPT | Performed by: FAMILY MEDICINE

## 2021-04-14 PROCEDURE — G8417 CALC BMI ABV UP PARAM F/U: HCPCS | Performed by: FAMILY MEDICINE

## 2021-04-14 PROCEDURE — 1036F TOBACCO NON-USER: CPT | Performed by: FAMILY MEDICINE

## 2021-04-14 ASSESSMENT — ENCOUNTER SYMPTOMS
GASTROINTESTINAL NEGATIVE: 1
ALLERGIC/IMMUNOLOGIC NEGATIVE: 1
BACK PAIN: 1

## 2021-04-14 ASSESSMENT — PATIENT HEALTH QUESTIONNAIRE - PHQ9
2. FEELING DOWN, DEPRESSED OR HOPELESS: 0
1. LITTLE INTEREST OR PLEASURE IN DOING THINGS: 0
SUM OF ALL RESPONSES TO PHQ QUESTIONS 1-9: 0

## 2021-04-14 NOTE — ASSESSMENT & PLAN NOTE
Onset x 1 yr. More frequent. Happens will exertion. Flat surface may be painful; incline very painful. Pain is retrosternal.  SOB with exertion. Some lightheadedness with squatting or bending over x one month. No diaphoresis, sweating. Complicated by Parkinson's. Some weakness of legs with inactivity.

## 2021-04-14 NOTE — PROGRESS NOTES
2021    Jarrod Olvera (:  1960) is a 61 y.o. male, here for evaluation of the following medical concerns:  Chief Complaint   Patient presents with    New Patient     est. pcp    Chest Pain     chest pain x few mos / feels faint when squatting down       HPI    1. Chest pain, unspecified type  Assessment & Plan: Onset x 1 yr. More frequent. Happens will exertion. Flat surface may be painful; incline very painful. Pain is retrosternal.  SOB with exertion. Some lightheadedness with squatting or bending over x one month. No diaphoresis, sweating. Complicated by Parkinson's. Some weakness of legs with inactivity. Orders:  -      [facility]  2. Parkinson disease [facility])  Assessment & Plan: Onset x 2 years. On Sinemet. Sees Dr Daniella Dennison    3. Pinched nerve  Assessment & Plan:  Pinched nerve in low back; bottom of left foot numb; seens chiropractor. No Known Allergies    Prior to Visit Medications    Medication Sig Taking? Authorizing Provider   rotigotine (NEUPRO) 4 MG/24HR Place 2 patches onto the skin daily Yes RAFA Fox   carbidopa-levodopa (SINEMET)  MG per tablet Take 1 tablet by mouth 3 times daily Yes RAFA Fox   hydrochlorothiazide (HYDRODIURIL) 25 MG tablet TK 1 T PO QD Yes Historical Provider, MD   metoprolol tartrate (LOPRESSOR) 25 MG tablet TK 1 T PO QD Yes Historical Provider, MD   simvastatin (ZOCOR) 40 MG tablet TK 1 T PO D Yes Historical Provider, MD   meloxicam (MOBIC) 15 MG tablet Take 15 mg by mouth daily  Historical Provider, MD   rasagiline mesylate 1 MG TABS Take 1 mg by mouth daily  Historical Provider, MD          There is no immunization history on file for this patient.      Past Surgical History:   Procedure Laterality Date    FOOT SURGERY Bilateral     FOOT SURGERY      LT foot torn achilles        Social History     Socioeconomic History    Marital status:      Spouse name: Not on file    Number of children: Not on file    Years of education: Not on file    Highest education level: Not on file   Occupational History    Occupation:    Social Needs    Financial resource strain: Not on file    Food insecurity     Worry: Not on file     Inability: Not on file   [employer] needs     Medical: Not on file     Non-medical: Not on file   Tobacco Use    Smoking status: Never Smoker    Smokeless tobacco: Never Used   Substance and Sexual Activity    Alcohol use: No    Drug use: No    Sexual activity: Not Currently   Lifestyle    Physical activity     Days per week: Not on file     Minutes per session: Not on file    Stress: Not on file   Relationships    Social connections     Talks on phone: Not on file     Gets together: Not on file     Attends [employer] service: Not on file     Active member of club or organization: Not on file     Attends meetings of clubs or organizations: Not on file     Relationship status: Not on file    Intimate partner violence     Fear of current or ex partner: Not on file     Emotionally abused: Not on file     Physically abused: Not on file     Forced sexual activity: Not on file   Other Topics Concern    Not on file   Social History Narrative    Not on file        Family History   Problem Relation Age of Onset    Hypertension Mother     Heart Disease Father        Review of Systems   Constitutional: Negative. HENT: Negative. Eyes:        Contacts. Respiratory:        CPAP. Cardiovascular: Positive for chest pain. Gastrointestinal: Negative. Endocrine: Negative. Genitourinary: Negative. Musculoskeletal: Positive for back pain. Skin: Negative. Allergic/Immunologic: Negative. Neurological: Positive for light-headedness. Parkinson's. Hematological: Negative. Psychiatric/Behavioral: Negative.         Vitals:    04/14/21 1334   BP: 122/84   Pulse: 72   Resp: 18   Temp: 97.8 °F (36.6 °C) TempSrc: Oral   SpO2: 97%   Weight: 242 lb (109.8 kg)   Height: 6' (1.829 m)       Estimated body mass index is 32.82 kg/m² as calculated from the following:    Height as of this encounter: 6' (1.829 m). Weight as of this encounter: 242 lb (109.8 kg). BP Readings from Last 3 Encounters:   04/14/21 122/84   01/18/21 136/87   11/12/20 136/89        Physical Exam  Constitutional:       General: He is not in acute distress. Appearance: Normal appearance. He is obese. He is not ill-appearing, toxic-appearing or diaphoretic. HENT:      Head: Normocephalic and atraumatic. Right Ear: Tympanic membrane, ear canal and external ear normal. There is no impacted cerumen. Left Ear: Tympanic membrane, ear canal and external ear normal. There is no impacted cerumen. Nose: Nose normal. No congestion or rhinorrhea. Mouth/Throat:      Mouth: Mucous membranes are moist.      Pharynx: Oropharynx is clear. No oropharyngeal exudate or posterior oropharyngeal erythema. Eyes:      General: No scleral icterus. Right eye: No discharge. Left eye: No discharge. Extraocular Movements: Extraocular movements intact. Conjunctiva/sclera: Conjunctivae normal.      Pupils: Pupils are equal, round, and reactive to light. Neck:      Musculoskeletal: Normal range of motion and neck supple. No neck rigidity or muscular tenderness. Vascular: No carotid bruit. Cardiovascular:      Rate and Rhythm: Normal rate and regular rhythm. Pulses: Normal pulses. Heart sounds: Normal heart sounds. No murmur. No friction rub. No gallop. Pulmonary:      Effort: Pulmonary effort is normal. No respiratory distress. Breath sounds: Normal breath sounds. No stridor. No wheezing, rhonchi or rales. Chest:      Chest wall: No tenderness. Abdominal:      General: Bowel sounds are normal. There is no distension. Palpations: Abdomen is soft. There is no mass. Tenderness:  There is no abdominal tenderness. There is no right CVA tenderness, left CVA tenderness, guarding or rebound. Musculoskeletal: Normal range of motion. General: No swelling, tenderness, deformity or signs of injury. Right lower leg: No edema. Left lower leg: No edema. Comments: Tremor right arm. Lymphadenopathy:      Cervical: No cervical adenopathy. Skin:     General: Skin is warm and dry. Capillary Refill: Capillary refill takes less than 2 seconds. Coloration: Skin is not jaundiced or pale. Findings: No bruising, erythema, lesion or rash. Neurological:      General: No focal deficit present. Mental Status: He is alert and oriented to person, place, and time. Mental status is at baseline. Cranial Nerves: No cranial nerve deficit. Sensory: No sensory deficit. Motor: No weakness. Coordination: Coordination normal.      Gait: Gait normal.      Deep Tendon Reflexes: Reflexes normal.   Psychiatric:         Mood and Affect: Mood normal.         Behavior: Behavior normal.         ASSESSMENT/PLAN:  Jose Cobb was seen today for new patient and chest pain. Diagnoses and all orders for this visit:    Chest pain, unspecified type  -     EKG 12 [facility]    Parkinson disease [facility])    Pinched nerve         Return in about 3 months (around 7/14/2021). An  electronic signature was used to authenticate this note.     --Jackie Skelton DO on 4/14/2021 at 2:45 PM

## 2021-04-21 ENCOUNTER — OFFICE VISIT (OUTPATIENT)
Dept: CARDIOLOGY CLINIC | Age: 61
End: 2021-04-21
Payer: MEDICAID

## 2021-04-21 VITALS
OXYGEN SATURATION: 96 % | DIASTOLIC BLOOD PRESSURE: 68 MMHG | BODY MASS INDEX: 32.7 KG/M2 | HEART RATE: 72 BPM | HEIGHT: 72 IN | RESPIRATION RATE: 20 BRPM | WEIGHT: 241.4 LBS | SYSTOLIC BLOOD PRESSURE: 132 MMHG

## 2021-04-21 DIAGNOSIS — R07.9 CHEST PAIN, UNSPECIFIED TYPE: Primary | ICD-10-CM

## 2021-04-21 PROCEDURE — G8427 DOCREV CUR MEDS BY ELIG CLIN: HCPCS | Performed by: INTERNAL MEDICINE

## 2021-04-21 PROCEDURE — 93000 ELECTROCARDIOGRAM COMPLETE: CPT | Performed by: INTERNAL MEDICINE

## 2021-04-21 PROCEDURE — 99204 OFFICE O/P NEW MOD 45 MIN: CPT | Performed by: INTERNAL MEDICINE

## 2021-04-21 PROCEDURE — G8417 CALC BMI ABV UP PARAM F/U: HCPCS | Performed by: INTERNAL MEDICINE

## 2021-04-21 PROCEDURE — 1036F TOBACCO NON-USER: CPT | Performed by: INTERNAL MEDICINE

## 2021-04-21 PROCEDURE — 3017F COLORECTAL CA SCREEN DOC REV: CPT | Performed by: INTERNAL MEDICINE

## 2021-04-21 SDOH — HEALTH STABILITY: MENTAL HEALTH: HOW MANY STANDARD DRINKS CONTAINING ALCOHOL DO YOU HAVE ON A TYPICAL DAY?: NOT ASKED

## 2021-04-21 NOTE — PROGRESS NOTES
Chief Complaint   Patient presents with    Chest Pain       Patient Active Problem List    Diagnosis Date Noted    Chest pain 04/14/2021    Pinched nerve 04/14/2021    Motor fluctuations related to medication use in Parkinson's disease ([facility] 09/30/2019    Parkinson disease ([facility] 02/07/2019    Essential hypertension 07/10/2018    GISELA (obstructive sleep apnea) 07/10/2018    Pure hypercholesterolemia 07/10/2018    Memory difficulty 07/10/2018       Current Outpatient Medications   Medication Sig Dispense Refill    rotigotine (NEUPRO) 4 MG/24HR Place 2 patches onto the skin daily 42 patch 0    carbidopa-levodopa (SINEMET)  MG per tablet Take 1 tablet by mouth 3 times daily 90 tablet 2    hydrochlorothiazide (HYDRODIURIL) 25 MG tablet TK 1 T PO QD  1    metoprolol tartrate (LOPRESSOR) 25 MG tablet TK 1 T PO QD  5    simvastatin (ZOCOR) 40 MG tablet TK 1 T PO D  1    meloxicam (MOBIC) 15 MG tablet Take 15 mg by mouth daily      rasagiline mesylate 1 MG TABS Take 1 mg by mouth daily       Current Facility-Administered Medications   Medication Dose Route Frequency Provider Last Rate Last Admin    regadenoson (LEXISCAN) injection 0.4 mg  0.4 mg Intravenous ONCE PRN Jaye Carver MD            No Known Allergies    Vitals:    04/21/21 1252   BP: 132/68   Site: Right Upper Arm   Position: Sitting   Cuff Size: Medium Adult   Pulse: 72   Resp: 20   SpO2: 96%   Weight: 241 lb 6.4 oz (109.5 kg)   Height: 6' (1.829 m)       Social History     Socioeconomic History    Marital status:      Spouse name: Not on file    Number of children: Not on file    Years of education: Not on file    Highest education level: Not on file   Occupational History    Occupation:    Social Needs    Financial resource strain: Not on file    Food insecurity     Worry: Not on file     Inability: Not on file    Transportation needs     Medical: Not on file     Non-medical: Not on file   Tobacco Use    Smoking status: Never Smoker    Smokeless tobacco: Never Used   Substance and Sexual Activity    Alcohol use: Not Currently     Comment: NONE SINCE 2016    Drug use: Never    Sexual activity: Not Currently   Lifestyle    Physical activity     Days per week: Not on file     Minutes per session: Not on file    Stress: Not on file   Relationships    Social connections     Talks on phone: Not on file     Gets together: Not on file     Attends [employer] service: Not on file     Active member of club or organization: Not on file     Attends meetings of clubs or organizations: Not on file     Relationship status: Not on file    Intimate partner violence     Fear of current or ex partner: Not on file     Emotionally abused: Not on file     Physically abused: Not on file     Forced sexual activity: Not on file   Other Topics Concern    Not on file   Social History Narrative    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 Kamla Villatoro - for cardiac evaluation. Hx of Parkinsonism and reduced activity. Dakota Guerra He complains of 6 months of nearly daily chest discomfort at rest, well localized to low sternum described as sharp pain, no assoc symptoms and denies   palpitations and syncope. He also will feel some fatigue in legs, dyspnea and chest discomfort when he walks up a grade. Review of Systems:   Heart: as above   Lungs: as above   Eyes: denies changes in vision or discharge. Ears: denies changes in hearing or pain. Nose: denies epistaxis or masses   Throat: denies sore throat or trouble swallowing. Neuro: denies numbness, tingling, tremors. Skin: denies rashes or itching.    : denies hematuria, dysuria   GI: denies vomiting, diarrhea   Psych: denies mood changed,

## 2021-04-27 ENCOUNTER — TELEPHONE (OUTPATIENT)
Dept: NEUROLOGY | Age: 61
End: 2021-04-27

## 2021-04-28 RX ORDER — ROTIGOTINE 4 MG/24H
2 PATCH, EXTENDED RELEASE TRANSDERMAL DAILY
Qty: 42 PATCH | Refills: 0 | COMMUNITY
Start: 2021-04-28 | End: 2021-06-15

## 2021-04-29 NOTE — TELEPHONE ENCOUNTER
Last Appointment:  Visit date not found  Future Appointments   Date Time Provider Jean Carlos Day   5/5/2021  9:30 AM [facility] [place]   5/5/2021  9:30 AM IGOR FORD NM RM1 SEYZ CARDIO [facility]   5/6/2021  8:00 AM RAFA Ibrahim Neuro [facility]   6/9/2021  9:00 AM RAFA Ibrahim NEURO [facility]   7/15/2021  9:30 AM Chepe Cr  [address]

## 2021-04-30 ENCOUNTER — TELEPHONE (OUTPATIENT)
Dept: CARDIOLOGY | Age: 61
End: 2021-04-30

## 2021-04-30 DIAGNOSIS — Z12.5 ENCOUNTER FOR PROSTATE CANCER SCREENING: ICD-10-CM

## 2021-04-30 DIAGNOSIS — R73.9 HYPERGLYCEMIA: ICD-10-CM

## 2021-04-30 DIAGNOSIS — E78.5 HYPERLIPIDEMIA, UNSPECIFIED HYPERLIPIDEMIA TYPE: ICD-10-CM

## 2021-04-30 DIAGNOSIS — E55.9 VITAMIN D DEFICIENCY: ICD-10-CM

## 2021-04-30 DIAGNOSIS — D64.9 ANEMIA, UNSPECIFIED TYPE: Primary | ICD-10-CM

## 2021-04-30 RX ORDER — SIMVASTATIN 40 MG
TABLET ORAL
Qty: 30 TABLET | Refills: 1 | Status: SHIPPED
Start: 2021-04-30 | End: 2021-07-19 | Stop reason: SDUPTHER

## 2021-04-30 RX ORDER — HYDROCHLOROTHIAZIDE 25 MG/1
TABLET ORAL
Qty: 30 TABLET | Refills: 1 | Status: SHIPPED
Start: 2021-04-30 | End: 2021-07-19 | Stop reason: SDUPTHER

## 2021-04-30 NOTE — TELEPHONE ENCOUNTER
4-30-21 @ 1235. Spoke with Dr. Neda Pérez regarding hi soffice note saying will do Adenosine stress test on the patient.  He said it is to be a Lexiscan stress test.  Yissel Davies RN

## 2021-05-05 ENCOUNTER — HOSPITAL ENCOUNTER (OUTPATIENT)
Dept: CARDIOLOGY | Age: 61
Discharge: HOME | End: 2021-05-05
Payer: MEDICAID

## 2021-05-05 ENCOUNTER — TELEPHONE (OUTPATIENT)
Dept: CARDIOLOGY CLINIC | Age: 61
End: 2021-05-05

## 2021-05-05 VITALS
HEART RATE: 71 BPM | RESPIRATION RATE: 16 BRPM | SYSTOLIC BLOOD PRESSURE: 122 MMHG | DIASTOLIC BLOOD PRESSURE: 86 MMHG | HEIGHT: 72 IN | WEIGHT: 233 LBS | BODY MASS INDEX: 31.56 KG/M2

## 2021-05-05 DIAGNOSIS — R07.9 CHEST PAIN, UNSPECIFIED TYPE: ICD-10-CM

## 2021-05-05 PROCEDURE — 93017 CV STRESS TEST TRACING ONLY: CPT

## 2021-05-05 PROCEDURE — A9502 TC99M TETROFOSMIN: HCPCS | Performed by: INTERNAL MEDICINE

## 2021-05-05 PROCEDURE — 2580000003 HC RX 258: Performed by: INTERNAL MEDICINE

## 2021-05-05 PROCEDURE — 78452 HT MUSCLE IMAGE SPECT MULT: CPT

## 2021-05-05 PROCEDURE — 3430000000 HC RX DIAGNOSTIC RADIOPHARMACEUTICAL: Performed by: INTERNAL MEDICINE

## 2021-05-05 PROCEDURE — 6360000002 HC RX W HCPCS: Performed by: INTERNAL MEDICINE

## 2021-05-05 RX ORDER — SODIUM CHLORIDE 0.9 % (FLUSH) 0.9 %
10 SYRINGE (ML) INJECTION PRN
Status: DISCONTINUED | OUTPATIENT
Start: 2021-05-05 | End: 2021-05-06 | Stop reason: HOSPADM

## 2021-05-05 RX ADMIN — SODIUM CHLORIDE, PRESERVATIVE FREE 10 ML: 5 INJECTION INTRAVENOUS at 10:48

## 2021-05-05 RX ADMIN — TETROFOSMIN 7.9 MILLICURIE: 0.23 INJECTION, POWDER, LYOPHILIZED, FOR SOLUTION INTRAVENOUS at 09:50

## 2021-05-05 RX ADMIN — REGADENOSON 0.4 MG: 0.08 INJECTION, SOLUTION INTRAVENOUS at 10:48

## 2021-05-05 RX ADMIN — SODIUM CHLORIDE, PRESERVATIVE FREE 10 ML: 5 INJECTION INTRAVENOUS at 10:49

## 2021-05-05 RX ADMIN — TETROFOSMIN 28.8 MILLICURIE: 0.23 INJECTION, POWDER, LYOPHILIZED, FOR SOLUTION INTRAVENOUS at 10:48

## 2021-05-05 RX ADMIN — SODIUM CHLORIDE, PRESERVATIVE FREE 10 ML: 5 INJECTION INTRAVENOUS at 09:50

## 2021-05-05 NOTE — TELEPHONE ENCOUNTER
Mercedes Dowell MD  Physician  Specialty:  Cardiology  Progress Notes  Signed  Date of Service:  5/5/2021  9:30 AM    Signed      Stress normal  Ov prn         Patients wife notified and instructed on stress test results

## 2021-05-05 NOTE — PROCEDURES
imaging in the short, vertical long, and horizontal long axis demonstrated normal homogeneous tracer distribution throughout the myocardium. The resting images are normal.     Gated SPECT left ventricular ejection fraction was calculated to be 68%, with normal myocardial thickening and wall motion. Impression:    1. ECG during the infusion did not change. 2. The myocardial perfusion imaging was normal.    3. Overall left ventricular systolic function was normal without regional wall motion abnormalities. 4. Low risk general pharmacologic nuclear stress test.    Thank you for sending your patient to this [employer].        Electronically signed by Haven Godfrey MD on 5/5/21 at 11:57 AM EDT

## 2021-05-06 ENCOUNTER — VIRTUAL VISIT (OUTPATIENT)
Dept: NEUROLOGY | Age: 61
End: 2021-05-06
Payer: MEDICAID

## 2021-05-06 DIAGNOSIS — G20 PARKINSON'S DISEASE (HCC): Primary | ICD-10-CM

## 2021-05-06 PROCEDURE — 99442 PR PHYS/QHP TELEPHONE EVALUATION 11-20 MIN: CPT | Performed by: CLINICAL NURSE SPECIALIST

## 2021-05-06 NOTE — PROGRESS NOTES
Sarah Anthony is a 61 y.o. right handed man    evaluated via telephone on 5/6/2021. Consent:  He and/or health care decision maker is aware that that he may receive a bill for this telephone service, depending on his insurance coverage, and has provided verbal consent to proceed: Yes    I affirm this is a Patient Initiated Episode with an Established Patient who has not had a related appointment within my department in the past 7 days or scheduled within the next 24 hours. The patient's identity was verified at the start of the call. Others involved in call: just pt     Total Time: minutes: 21-30 minutes    Consent:  The patient and/or health care decision maker is aware that that he may receive a bill for this telephone service, depending on his insurance coverage, and has provided verbal consent to proceed: Yes  Patient advised regarding steps to help prevent the spread of COVID-19   SOURCE - https://allison-akira.info/. html     1-Stay home except to get medical care  2-Clean your hands often for atleast 20 secnds, avoid touching: Avoid touching your eyes, nose, and mouth with unwashed hands. 3-Seek medical attention: Seek prompt medical attention if your illness is worsening (e.g., difficulty breathing). Call you doctor first.  3-Wear a facemask if you are sick   4-Cover your coughs and sneezes     Note: not billable if this call serves to triage the patient into an appointment for the relevant concern    Patient was referred for a history of Parkinson's disease. States he was diagnosed 3 years ago -- found to be \"walking stiff\" by a  and went to see his Dr who referred him to [facility].   There, he was dx with idiopathic Parkinson's disease and started on Mirapex  He titrated with little effect -- in fact, it made him tired, irritable and did not seem to help his stiffness nor his tremor     They switched him to Azilect 1mg daily and he felt it was better \"but not much\"  He feels it works better than Mirapex but still stiff, and still with tremor    We tried Neupro titrating to 8mg    He did note improvement in swinging his arms when he walks   He also notes his golf game seemed better on this medication     At a previous appt, he asked if there was something better and we discussed Sinemet but were hesitant d/t his age -- he reported [facility] felt the same   But, given his s/s and complaints, we decided to try a small dose of Sinemet just with breakfast and dinner  His wife called the week after with notes of weight loss -- he was losing 5# per week   He tells me that he was not upset with this (\"I needed to lose weight\") but states his wife was worried   We held the Sinemet and kept him just on Neupro 8mg daily     He asked to restart Sinemet but I remained hesitant d/t ill effects and his young age   However we did agree to try once daily and have him follow with Dr Brittney Liz for possible alternatives   He agreed with Sinemet and felt the dose should be titrated therefore we have been doing so    Now taking dose three to four times a day but still having issues when interviewing for employment    We did discuss trialing extra dose on interview days    now states he is having trouble with ED and now going to the bathroom   We discussed need for urology eval because though ED can be r/t PD his flow issues for urine should not be related      No falls   No trouble chewing or swallowing  Imaging reviewed from a few years ago     No headache  No stroke or seizure hx     No chest pain or palpitations  No SOB  No vertigo, lightheadedness or loss of consciousness  No falls, tripping or stumbling  No incontinence of bowels or bladder  No itching or bruising appreciated  No numbness, tingling or focal arm/leg weakness    ROS otherwise negative     Objective:     Mental Status: Alert, oriented to person place and year     Speech: clear without vocal quivering   Language: appropriate Breathing seems unlabored       (I could hear his resting tremor on the phone call)     Laboratory/Radiology:     Recent labs per PCP -- none to personally review    MRI Brain 2009  Unremarkable     I independently reviewed the labs and imaging studies today. Assessment:     Idiopathic Parkinson's disease   No help with Mirapex and minimal help with Azilect 1mg daily   [facility] recommended holding off with Sinemet and I agreed given his age but needed to try something different given his limited response to Mirapex, Neupro and Azilect     Plan:      Will continue Sinemet -TID but can take extra dose on interview days (2 at one time)   Will test it out before doing so for interviews and report back    Continue Neupro 8mg samples -- insurance refusing to cover despite failure of multiple options     We did discuss Rytary as a possibility in past     Mariella Michael  7:59 AM  5/6/2021

## 2021-05-06 NOTE — PROGRESS NOTES
Bedelia Carrel was read the following message We want to confirm that, for purposes of billing, this is a virtual visit with your provider for which we will submit a claim for reimbursement with your insurance company. You will be responsible for any copays, coinsurance amounts or other amounts not covered by your insurance company. If you do not accept this, unfortunately we will not be able to schedule or proceed with a virtual visit with the provider. Do you accept? Halle Alas responded Yes .

## 2021-06-04 LAB — PROSTATE SPECIFIC ANTIGEN: 4.81 NG/ML (ref 0–4)

## 2021-06-15 RX ORDER — ROTIGOTINE 4 MG/24H
2 PATCH, EXTENDED RELEASE TRANSDERMAL DAILY
Qty: 42 PATCH | Refills: 0 | COMMUNITY
Start: 2021-06-15 | End: 2021-11-05 | Stop reason: DRUGHIGH

## 2021-06-19 ENCOUNTER — HOSPITAL ENCOUNTER (OUTPATIENT)
Age: 61
Discharge: HOME | End: 2021-06-19
Payer: COMMERCIAL

## 2021-06-19 LAB — PROSTATE SPECIFIC ANTIGEN: 3.36 NG/ML (ref 0–4)

## 2021-06-19 PROCEDURE — 36415 COLL VENOUS BLD VENIPUNCTURE: CPT

## 2021-06-19 PROCEDURE — 84153 ASSAY OF PSA TOTAL: CPT

## 2021-07-19 DIAGNOSIS — E78.00 PURE HYPERCHOLESTEROLEMIA: ICD-10-CM

## 2021-07-19 DIAGNOSIS — I10 ESSENTIAL HYPERTENSION: Primary | ICD-10-CM

## 2021-07-19 RX ORDER — SIMVASTATIN 40 MG
TABLET ORAL
Qty: 30 TABLET | Refills: 1 | Status: SHIPPED
Start: 2021-07-19 | End: 2021-07-20 | Stop reason: SDUPTHER

## 2021-07-19 RX ORDER — HYDROCHLOROTHIAZIDE 25 MG/1
TABLET ORAL
Qty: 30 TABLET | Refills: 1 | Status: SHIPPED
Start: 2021-07-19 | End: 2021-07-20 | Stop reason: SDUPTHER

## 2021-07-20 DIAGNOSIS — E78.00 PURE HYPERCHOLESTEROLEMIA: ICD-10-CM

## 2021-07-20 DIAGNOSIS — I10 ESSENTIAL HYPERTENSION: ICD-10-CM

## 2021-07-20 RX ORDER — SIMVASTATIN 40 MG
TABLET ORAL
Qty: 30 TABLET | Refills: 1 | Status: SHIPPED
Start: 2021-07-20 | End: 2021-10-17

## 2021-07-20 RX ORDER — HYDROCHLOROTHIAZIDE 25 MG/1
TABLET ORAL
Qty: 30 TABLET | Refills: 1 | Status: SHIPPED | OUTPATIENT
Start: 2021-07-20

## 2021-09-17 ENCOUNTER — HOSPITAL ENCOUNTER (OUTPATIENT)
Dept: NEUROLOGY | Age: 61
Discharge: HOME | End: 2021-09-17
Payer: COMMERCIAL

## 2021-09-17 PROCEDURE — 95886 MUSC TEST DONE W/N TEST COMP: CPT

## 2021-09-17 PROCEDURE — 95886 MUSC TEST DONE W/N TEST COMP: CPT | Performed by: PSYCHIATRY & NEUROLOGY

## 2021-09-17 PROCEDURE — 95910 NRV CNDJ TEST 7-8 STUDIES: CPT | Performed by: PSYCHIATRY & NEUROLOGY

## 2021-09-17 PROCEDURE — 95910 NRV CNDJ TEST 7-8 STUDIES: CPT

## 2021-09-17 NOTE — PROCEDURES
[facility]  [address].  Electrodiagnostic Laboratory   [place]         Full Name: Salma Cheatham Gender: Male  MRN: [id number] YOB: 1960  Location[de-identified] [facility]      Visit Date: 9/17/2021 15:14  Age: 64 Years 1 Months Old  Examining Physician: Dr. Ileana Ramires   Referring Physician: Franny Arrington  Technician: Angeli Alicea   Height: 6 feet 0 inch  Weight: 233 lbs; BMI 31.6  Notes: Numbness & Tingling         Motor NCS      Nerve / Sites Lat. Lat Diff Amplitude Amp. 1-2 Distance Velocity Temp.    ms ms mV % cm m/s °C   R Median - APB      Wrist 3.33  12.9 100 8  32.6      Elbow 7.92 4.58 12.8 99.1 24 52 33.1   L Median - APB      Wrist 3.54  8.2 100 8  33      Elbow 8.07 4.53 7.5 92.4   33   R Ulnar - ADM      Wrist 2.81  6.7 100 8  33.6      B. Elbow 6.93 4.11 5.9 88 23 56 33.5      A. Elbow 9.06 2.14 4.8 71.8 10 47 33.3             Sensory NCS      Nerve / Sites Onset Lat Peak Lat PP Amp Distance Velocity Temp.    ms ms µV cm m/s °C   R Median - Digit II (Antidromic)      Mid Palm 1.25 2.03 15.4 7 56 33      Wrist 2.45 3.33 14.7 14 57 33   L Median - Digit II (Antidromic)      Mid Palm 1.09 1.82 14.0 7 64 32.9      Wrist 2.19 3.07 13.7 14 64 32.9   R Ulnar - Digit V (Antidromic)      Wrist 2.55 3.13 21.3 14 55 32.9   R Radial - Anatomical snuff box (Forearm)      Forearm 1.72 2.19 14.6 10 58 33.5       F  Wave      Nerve F Lat M Lat F-M Lat    ms ms ms   R Median - APB 29.8 3.1 26.7   R Ulnar - ADM 30.0 3.3 26.7   L Median - APB 29.4 3.9 25.5       EMG         EMG Summary Table     Spontaneous MUAP Recruitment   Muscle IA Fib PSW Fasc H.F. Amp Dur. PPP Pattern   R. Cervical paraspinals (mid) N None None None None N N N N   R. Cervical paraspinals (low) N None None None None N N N N   R. Deltoid N None None None None N N N N   R. Triceps brachii N None None None None N N N N   R. Biceps brachii N None None None None N N N N   R. Brachioradialis N None None None None N N N N   R.  Extensor indicis Stable gyn examination  No Pap smear return to office in 1 year  Use condoms is sexual activity returns  proprius N None None None None N N N N   R. Flexor digitorum profundus (Ulnar) N None None None None N N N N   R. Flexor pollicis longus N None None None None N N N N   R. First dorsal interosseous N None None None None N N N N   R. Abductor pollicis brevis N None None None None N N N N         Nerve conduction studies in both arms displayed no abnormalities. These findings were compared to the referential values in this laboratory, available upon request.    Monopolar needle examination of the right arm was unremarkable, displaying only resting tremor. Needle testing of the paraspinals was normal.    Electrodiagnostic examination of both arms was normal at this time. There were no peripheral neuropathies. There were no motor radiculopathies or intracanalicular lesions. Sensory radiculopathies cannot be evaluated by electrodiagnostic means. Clinically, the patient diagnosed with Parkinson's disease several years ago. He presented with diffuse numbness and tingling. A brief neurological examination, I found no sensory compromise but moderate bradykinesia and resting tremors of his left hand. His paresthesias may be related to parkinsonism alone. I first recommended increasing his carbidopa/levodopa dosing. Clinical correlation was highly advised.

## 2021-09-22 ENCOUNTER — TELEPHONE (OUTPATIENT)
Dept: NEUROLOGY | Age: 61
End: 2021-09-22

## 2021-09-22 NOTE — TELEPHONE ENCOUNTER
Patient called in and left voice message requesting a call back regarding medication and changes that Dr. Tiara Sam recommended while he was doing his EMG. Called patient and he stated that he is taking his Sinemet 25/100 QID instead of TID 7:30, 10:30 and 1:30 while at work, takes the last dose before he drives home from work and only uses the [place] patch  when he feels it is needed. He is taking Sinemet  He stated that the current dose of Sinemet is not working as well as he wants it to. He is wanting to know if his medication needs to be increased.

## 2021-09-23 NOTE — TELEPHONE ENCOUNTER
Called and left message for patient to call the office and to schedule OVE appointment with Dr. Fran Burr per Michael Part.

## 2021-09-27 NOTE — TELEPHONE ENCOUNTER
Patient's wife called in stating that Kathie Mcdowell needs refills on his Sinemet and would like to have it sent for 4 times a day while awake because that is how he is taking it now. He does have an appointment with Dr. Pham File 12/8/21 per your request. He does need medication until then. Saw order was already placed and deleted my order to Harshil Morillo.

## 2021-09-28 ENCOUNTER — TELEPHONE (OUTPATIENT)
Dept: NEUROLOGY | Age: 61
End: 2021-09-28

## 2021-09-28 NOTE — TELEPHONE ENCOUNTER
Wife called requesting samples for Neupro. She is not sure what mg of Neupro patient is taking. She states that she only has 6mg boxes right now. Per office note on 5/6/21 pateint is to be on 8mg Neupro.    Please advise

## 2021-10-14 DIAGNOSIS — E78.00 PURE HYPERCHOLESTEROLEMIA: ICD-10-CM

## 2021-10-17 RX ORDER — SIMVASTATIN 40 MG
TABLET ORAL
Qty: 30 TABLET | Refills: 1 | Status: SHIPPED | OUTPATIENT
Start: 2021-10-17

## 2021-11-05 ENCOUNTER — TELEPHONE (OUTPATIENT)
Dept: NEUROLOGY | Age: 61
End: 2021-11-05

## 2021-11-05 RX ORDER — RIMEGEPANT SULFATE 75 MG/75MG
1 TABLET, ORALLY DISINTEGRATING ORAL DAILY
Qty: 35 TABLET | Refills: 0 | COMMUNITY
Start: 2021-11-05 | End: 2021-12-08

## 2021-11-05 NOTE — TELEPHONE ENCOUNTER
Patient's wife called in stating that Sailaja Gaitan needs more patches. She is concerned because last note 9/28/21 states he should use 6 mg PRN but she said he is using 6 mg every day.  Please advise

## 2021-12-08 ENCOUNTER — OFFICE VISIT (OUTPATIENT)
Dept: NEUROLOGY | Age: 61
End: 2021-12-08
Payer: COMMERCIAL

## 2021-12-08 VITALS
WEIGHT: 235 LBS | OXYGEN SATURATION: 98 % | HEIGHT: 72 IN | BODY MASS INDEX: 31.83 KG/M2 | DIASTOLIC BLOOD PRESSURE: 70 MMHG | HEART RATE: 78 BPM | SYSTOLIC BLOOD PRESSURE: 114 MMHG | RESPIRATION RATE: 18 BRPM

## 2021-12-08 DIAGNOSIS — G20 PARKINSON'S DISEASE (HCC): Primary | ICD-10-CM

## 2021-12-08 PROCEDURE — 99215 OFFICE O/P EST HI 40 MIN: CPT | Performed by: PSYCHIATRY & NEUROLOGY

## 2021-12-08 RX ORDER — ROTIGOTINE 6 MG/24H
1 PATCH, EXTENDED RELEASE TRANSDERMAL DAILY
COMMUNITY
End: 2022-04-13 | Stop reason: SDUPTHER

## 2021-12-08 RX ORDER — CARBIDOPA AND LEVODOPA 50; 200 MG/1; MG/1
1 TABLET, EXTENDED RELEASE ORAL
Qty: 120 TABLET | Refills: 11 | Status: SHIPPED
Start: 2021-12-08 | End: 2022-04-13 | Stop reason: ALTCHOICE

## 2021-12-08 NOTE — PROGRESS NOTES
This 59-year-old right-handed [place] man was referred by my nurse practitioner for additional evaluation and management of Parkinson's disease. He was an excellent historian. His current medications were rotigotine 6 mg daily, carbidopa/levodopa 25/100 tablets every 4 hours while awake, metoprolol, hydrochlorothiazide, rasagiline and meloxicam.    His medical history was remarkable for hypertension, hyperlipidemia, obstructive sleep apnea and degenerative joint disease. He denied diabetes mellitus, strokes, seizures, lung disorders, heart disease, gastrointestinal issues, kidney disorders, cancers, skin abnormalities or depression. His only surgery was repair of a left torn Achilles tendon. He denied trauma. He was a native of [address] [place] [address], moving to the [place] in 1983 to work for [employer] . 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 He was still working as an  for another company. He was  with 2 healthy children and 5 healthy grandchildren. There were no neurological disorders in the family. His family does suffer from diabetes mellitus. He did not smoke, drink or use illicit drugs. He slept moderately well with CPAP. He ate well. He received his COVID-19 vaccinations. Review of systems was otherwise unremarkable, except as noted below. 3 years ago, a colleague observed slowing of his gait. He was also hunched over. He was unaware of this development himself, until months later. He realized he was not moving his arms while walking. 1 year ago his right hand began shaking, especially when walking. These issues were worsening. He was diagnosed with Parkinson's disease. He denied any improvement with his current medications. He was started on carbidopa/levodopa 25/100 tablets 4 times daily, with no effect. Rotigotine was more recently added, again without relief.   He had not tried higher doses or other medications. There were questionable memory deficits. His speech was now soft and slow. He denied swallowing or chewing difficulties. He was not drooling. He was not falling or tripping. He denied headaches, but minimal neck and back pains. There was no weakness, sensory loss, clumsiness, spinning sensations, seizure-like activity or loss of consciousness. He was referred by my nurse practitioner for additional management. He denied any current medical issues. Physical examination displayed stable vital signs. He was an elderly man in no acute distress, who was alert, cooperative and oriented. He was very pleasant. His skin was unremarkable, with no lymphadenopathy. Head examination was unrevealing, except for a small posterior pharyngeal aperture. His neck was rigid with cogwheeling, without thyromegaly. There were +1 carotid upstrokes without bruits; there was minimally limited range of motion of his neck in all directions. His spine displayed minimal gibbus deformity, without tenderness. His lungs were clear to auscultation. Cardiac testing proved unrevealing. His abdomen was also unremarkable. There were +1 peripheral pulses without bruits. His limbs displayed no abnormalities. Neurological examination produced an intact mental status. His speech was soft; there was no dysarthria or aphasia. I found no cognitive decline, although his responses were slow at times. Cranial nerve testing revealed a marked mask facies, with Myerson's sign. Extraocular movements were intact. There were no facial jerks, snout, hyperactive gag or suck reflex. He was markedly bradykinetic, with minimal cogwheeling in both arms and wrists. He displayed intermittent tremor of his right hand, primarily when walking. Strength was 5/5 throughout, with normal bulk. There were no other abnormal movements. Reflexes were barely elicited throughout; there were no pathological ones.   All sensory modalities were intact. Finger-to-nose and heel shin testings were performed well but slowly. Rapid alternating movements and fine finger movements were also slow. He walked slowly, without festination. He turned en bloc. Romberg's was unremarkable. Laboratory data included an unremarkable CMP and CBC. His PSA was 3.36. No imaging studies were available. This individual presents a 3-year history of slowing movements and tremors. On my examination, he displays marked bradykinesia, minimal cogwheeling, masked face, intermittent resting tremor, soft speech and Myerson's sign. These signs are indicative of Parkinson's disease. He does not suffer from atypical parkinsonism. He requires increasing doses of his parkinsonian medications. I first will increase carbidopa/levodopa to the 50/200 preparation, every 4 hours while awake. The higher dose will be substituted every 4 days for the lower one. He will report back in 3 to 4 weeks. If necessary, the 25/250 preparation will be used. Higher doses of rotigotine as well as adding entacapone may be necessary. He is stable medically. He will return in 4 months. He will report back in 3 to 4 weeks after switching to the 50/200 preparation of carbidopa/levodopa every 4 hours. He will call at any time if problems arise. I also recommended increased exercising. I spent 80 minutes with the patient with over 50 % spent in counseling and disease management. All patient issues were addressed and all questions were answered.

## 2022-03-10 ENCOUNTER — TELEPHONE (OUTPATIENT)
Dept: NEUROLOGY | Age: 62
End: 2022-03-10

## 2022-03-10 NOTE — TELEPHONE ENCOUNTER
Patient's wife notified of samples Neupro 6 mg patches at ,  Lot #[id number]  Exp 07/2023. Electronically signed by Junie Collins MA on 3/10/22 at 1:08 PM EST

## 2022-03-10 NOTE — TELEPHONE ENCOUNTER
Patient is requesting samples of Neurpro patches 6 mg.   Electronically signed by Kiersten Pat MA on 3/10/22 at 11:56 AM EST

## 2022-04-13 ENCOUNTER — OFFICE VISIT (OUTPATIENT)
Dept: NEUROLOGY | Age: 62
End: 2022-04-13
Payer: COMMERCIAL

## 2022-04-13 VITALS
SYSTOLIC BLOOD PRESSURE: 125 MMHG | RESPIRATION RATE: 18 BRPM | BODY MASS INDEX: 32.78 KG/M2 | HEIGHT: 72 IN | OXYGEN SATURATION: 97 % | TEMPERATURE: 96.1 F | DIASTOLIC BLOOD PRESSURE: 83 MMHG | WEIGHT: 242 LBS | HEART RATE: 71 BPM

## 2022-04-13 DIAGNOSIS — G20 PARKINSON'S DISEASE (HCC): Primary | ICD-10-CM

## 2022-04-13 PROCEDURE — 99215 OFFICE O/P EST HI 40 MIN: CPT | Performed by: PSYCHIATRY & NEUROLOGY

## 2022-04-13 RX ORDER — ROTIGOTINE 6 MG/24H
1 PATCH, EXTENDED RELEASE TRANSDERMAL DAILY
Qty: 42 PATCH | Refills: 0 | COMMUNITY
Start: 2022-04-13 | End: 2022-07-27

## 2022-04-13 RX ORDER — CARBIDOPA/LEVODOPA 25MG-250MG
1 TABLET ORAL
Qty: 120 TABLET | Refills: 11 | Status: SHIPPED | OUTPATIENT
Start: 2022-04-13

## 2022-04-13 NOTE — PROGRESS NOTES
This 57-year-old right-handed [place] man was referred for additional evaluation and management of Parkinson's disease. He was an excellent historian. His medications were now rotigotine 6 mg daily, carbidopa/levodopa 50/200 tablets every 4 hours while awake, metoprolol, hydrochlorothiazide and simvastatin. His medical history was remarkable for hypertension, hyperlipidemia, obstructive sleep apnea and degenerative joint disease. He again denied diabetes mellitus, strokes, seizures, lung disorders, heart disease, gastrointestinal issues or depression. Over 3 years ago, a colleague observed slowing of his gait. He was also hunched over. He was unaware of this development himself, until months later. He realized he was not moving his arms while walking. Over 1 year ago his right hand began shaking, especially when walking. These issues gradually worsened. He was diagnosed with Parkinson's disease. He noted improvement with increasing doses of carbidopa/levodopa, as well as rotigotine. However, he was still slower than normal, with occasional tremors of the right hand with walking. He worked without issues. He now denied any memory deficits. He again denied swallowing or chewing difficulties. He was not drooling. He was not falling or tripping. He denied headaches, but minimal neck and back pains. There was no weakness, sensory loss, clumsiness, seizure-like activity or loss of consciousness. He again denied any current medical issues. He was sleeping and eating moderately well. He was only exercising minimally, noting pains in both knees. These discomforts improved using topical gels. Review of systems was otherwise unremarkable. Physical examination displayed stable vital signs. Blood pressure is 125/83. He was an elderly man in no acute distress, who was alert, cooperative and oriented. He remained very pleasant. His skin was unremarkable.   Head examination displayed the small posterior pharyngeal aperture. His neck produced minimal cogwheeling, without thyromegaly. There were +1 carotid upstrokes without bruits. His lungs were clear to auscultation. Cardiac testing proved unrevealing. There were +1 peripheral pulses without bruits. His limbs displayed no abnormalities. Neurological examination produced an intact mental status. His speech was still soft; there was no dysarthria or aphasia. I found no cognitive decline; he answered more quickly today. Cranial nerve testing revealed a minimal mask facies, without Myerson's sign. Extraocular movements continued intact. He was minimally bradykinetic, with persistent cogwheeling in both arms and wrists. He again displayed intermittent tremor of his right hand, only when walking. Strength was 5/5 throughout. Reflexes were barely elicited throughout; there were no pathological ones. All sensory modalities continued intact. Finger-to-nose and heel shin testings were performed well but slowly. He walked more quickly, without festination. He turned en bloc. Romberg's was unremarkable. His examination reveals improving motor function. Laboratory data included an unremarkable CMP and CBC. His PSA was 3.36. No imaging studies were available. This individual presents a 3-year history of slowing movements and tremors. On my examination, he displays marked bradykinesia, minimal cogwheeling, masked face, intermittent resting tremor, soft speech and Myerson's sign. These signs are indicative of Parkinson's disease. He does not suffer from atypical parkinsonism. He requires increasing doses of his parkinsonian medications. I will increase carbidopa/levodopa to the 25/250 preparation, every 4 hours while awake. He will continue with rotigotine 6 mg daily for now. He is otherwise stable neurologically. Jagruti Perla He continues well medically. He will return in 4 months, to see me.   He will report back in 4 weeks after switching to the 25/250 preparation of carbidopa/levodopa every 4 hours. He will call at any time if problems arise. I again recommended increased exercising. I spent 40 minutes with the patient with over 50 % spent in counseling and disease management. All patient issues were addressed and all questions were answered.

## 2022-07-12 ENCOUNTER — TELEPHONE (OUTPATIENT)
Dept: NEUROLOGY | Age: 62
End: 2022-07-12

## 2022-07-12 NOTE — TELEPHONE ENCOUNTER
Wife called requesting Neupro patches for patient. Using 6mg daily.     Dispensed 2 boxes of 4mg 2 boxes of 2mg  Lot # [id number]  Exp 07/2023

## 2022-07-14 NOTE — TELEPHONE ENCOUNTER
Thank you. However, please let them know that we will likely be unable to obtain samples in the future.   Thank you

## 2022-07-15 ENCOUNTER — TELEPHONE (OUTPATIENT)
Dept: NEUROLOGY | Age: 62
End: 2022-07-15

## 2022-07-15 RX ORDER — ROTIGOTINE 6 MG/24H
1 PATCH, EXTENDED RELEASE TRANSDERMAL DAILY
Qty: 30 PATCH | Refills: 11
Start: 2022-07-15 | End: 2022-07-27

## 2022-08-16 ENCOUNTER — OFFICE VISIT (OUTPATIENT)
Dept: NEUROLOGY | Age: 62
End: 2022-08-16
Payer: COMMERCIAL

## 2022-08-16 VITALS
WEIGHT: 228 LBS | SYSTOLIC BLOOD PRESSURE: 128 MMHG | HEART RATE: 73 BPM | DIASTOLIC BLOOD PRESSURE: 87 MMHG | BODY MASS INDEX: 30.92 KG/M2 | OXYGEN SATURATION: 96 % | RESPIRATION RATE: 18 BRPM | TEMPERATURE: 96.1 F

## 2022-08-16 DIAGNOSIS — G20 PARKINSON'S DISEASE (HCC): Primary | ICD-10-CM

## 2022-08-16 PROCEDURE — 99215 OFFICE O/P EST HI 40 MIN: CPT | Performed by: PSYCHIATRY & NEUROLOGY

## 2022-08-16 RX ORDER — ENTACAPONE 200 MG/1
200 TABLET ORAL
Qty: 120 TABLET | Refills: 11 | Status: SHIPPED | OUTPATIENT
Start: 2022-08-16 | End: 2023-08-16

## 2022-08-16 NOTE — PROGRESS NOTES
This 58year-old right-handed [place] man was referred for evaluation and management of Parkinson's disease. He remained an excellent historian. His medications were now rotigotine 6 mg daily, carbidopa/levodopa 25/250 tablets every 4 hours while awake, metoprolol, hydrochlorothiazide and simvastatin. He was unable to afford the rotigotine patches. His medical history was remarkable for hypertension, hyperlipidemia, obstructive sleep apnea and degenerative joint disease. He again denied diabetes mellitus, strokes, seizures, lung disorders, heart disease, gastrointestinal issues or depression. 4 years ago, a colleague observed slowing of his gait. He was hunched over. He was unaware of these issues himself, until months later. He realized he was not moving his arms while walking. 2 years ago his right hand began shaking, especially when walking. These deficits gradually worsened. He was diagnosed with Parkinson's disease. He noted improvement with increasing doses of carbidopa/levodopa, as well as rotigotine. On his last visit, carbidopa/levodopa was increased to 25/250 tablets. He improved. He still noted slowing down right after taking a dose. He questioned using other medications. He again denied swallowing or chewing difficulties. He was not drooling. He was not falling or tripping. He denied headaches, but minimal neck and back pains. There was no weakness, sensory loss, clumsiness, memory loss, seizure-like activity or loss of consciousness. He denied any current medical issues. He was sleeping and eating moderately well. He was only exercising more, despite pains in both knees. These discomforts improved using topical gels. He received his COVID-19 vaccinations and boosters. Review of systems was otherwise unremarkable. Physical examination displayed stable vital signs. Blood pressure was 128/87.   He was an elderly man in no acute distress, who was alert, cooperative and oriented. He remained pleasant. His skin was unremarkable. Head examination displayed the small posterior pharyngeal aperture. His neck produced minimal cogwheeling, without thyromegaly. There were +1 carotid upstrokes without bruits. His lungs were clear to auscultation. Cardiac testing proved unrevealing. There were +1 peripheral pulses without bruits. His limbs displayed no abnormalities. Neurological examination produced an intact mental status. His speech remained soft, without dysarthria or aphasia. I found no cognitive decline. Cranial nerve testing revealed a minimal mask facies, without Myerson's sign. Extraocular movements continued intact. He was minimally bradykinetic, with minimal to moderate cogwheeling in both arms and wrists. He displayed only slight, intermittent tremor of his right hand, when walking. Strength was 5/5 throughout. Reflexes were barely elicited throughout; there were no pathological ones. All sensory modalities continued intact. Finger-to-nose and heel shin testings were performed well but slowly. He walked more quickly, without festination. He did not en bloc. Romberg's was unremarkable. His examination displayed improving motor function. Laboratory data included past unremarkable CMP and CBC. His PSA was 3.36. No imaging studies were available. This individual presents a for year history of slowing movements and tremors. His parkinsonism has improved with increasing doses of carbidopa/levodopa. Due to difficulties obtaining rotigotine patches, I will now add entacapone 200 mg to every carbidopa/levodopa dosing. He is otherwise stable neurologically. Yvette Estevez He continues well medically. He will return in 3 months, to see me. Entacapone was added, as noted above. He will call at any time if problems arise. Over an hour    I spent 40 minutes with the patient with over 50 % spent in counseling and disease management. All patient issues were addressed and all questions were answered.

## 2022-09-07 ENCOUNTER — TELEPHONE (OUTPATIENT)
Dept: NEUROLOGY | Age: 62
End: 2022-09-07

## 2022-09-07 NOTE — TELEPHONE ENCOUNTER
Patient's wife called in stating that since starting the [place], he has been getting dizzy spells and yesterday he fainted getting out of the tub and hit his head. He again felt dizzy today when he was at work. It happens multiple times daily. It has gotten worse since starting the medication. Please advise.

## 2022-09-09 NOTE — TELEPHONE ENCOUNTER
Called and left message with instructions from Dr. Oseas Wise and to call the office with any questions.

## 2022-10-04 ENCOUNTER — OFFICE VISIT (OUTPATIENT)
Dept: NEUROLOGY | Age: 62
End: 2022-10-04
Payer: COMMERCIAL

## 2022-10-04 VITALS
DIASTOLIC BLOOD PRESSURE: 90 MMHG | BODY MASS INDEX: 30.48 KG/M2 | HEIGHT: 72 IN | OXYGEN SATURATION: 97 % | RESPIRATION RATE: 18 BRPM | TEMPERATURE: 97.4 F | HEART RATE: 84 BPM | WEIGHT: 225 LBS | SYSTOLIC BLOOD PRESSURE: 137 MMHG

## 2022-10-04 DIAGNOSIS — G20 PARKINSON'S DISEASE (HCC): Primary | ICD-10-CM

## 2022-10-04 PROCEDURE — 99215 OFFICE O/P EST HI 40 MIN: CPT | Performed by: PSYCHIATRY & NEUROLOGY

## 2022-10-04 RX ORDER — ROPINIROLE 4 MG/1
4 TABLET, FILM COATED, EXTENDED RELEASE ORAL 2 TIMES DAILY
Qty: 60 TABLET | Refills: 5 | Status: SHIPPED | OUTPATIENT
Start: 2022-10-04 | End: 2023-04-02

## 2022-10-04 NOTE — PROGRESS NOTES
This 58year-old right-handed man was referred for evaluation and management of Parkinson's disease. He remained an excellent historian. He was alone. His medications were now carbidopa/levodopa 25/250 tablets every 4 hours while awake, metoprolol, hydrochlorothiazide and simvastatin. He was unable to afford the rotigotine patches and requested other medications. His medical history was remarkable for hypertension, hyperlipidemia, obstructive sleep apnea and degenerative joint disease. He again denied diabetes mellitus, strokes, seizures, lung disorders, heart disease, gastrointestinal issues or depression. Over 4 years ago, a colleague observed slowing of his gait. He was hunched over. He was unaware of these issues himself, until months later. He realized he was not moving his arms while walking. Over 2 years ago his right hand began shaking, especially when walking. These deficits gradually worsened. He was diagnosed with Parkinson's disease. He noted improvement with increasing doses of carbidopa/levodopa, as well as rotigotine. On his last visit, carbidopa/levodopa was increased to 25/250 tablets. Unfortunately, rotigotine was discontinued because as this insurance denied approval of that drug. Subsequently, he was slower. He noted carbidopa/levodopa and entacapone did not last for hours. He questioned increasing doses or other medications. He again denied chewing difficulties. He was not drooling. He was not falling or tripping. There were rare swallowing difficulties. He denied headaches, but minimal neck and back pains. There was no weakness, sensory loss, clumsiness, memory loss, seizure-like activity or loss of consciousness. He been suffering from multisystem atrophy. I informed him he did not have that disorder, which would not respond to any doses of carbidopa/levodopa or other parkinsonian medications. 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 He had to speak twice at that meeting and was quite anxious. He again denied any current medical issues. He was sleeping and eating moderately well. He was only exercising more, despite pains in both knees. These discomforts improved using topical gels. He received his COVID-19 vaccinations and boosters. Review of systems was otherwise unremarkable. Physical examination displayed stable vital signs. Blood pressure was 137/90. He was an elderly man in no acute distress, who was alert, cooperative and oriented. He remained pleasant. His skin was unremarkable. Head examination displayed the small posterior pharyngeal aperture. His neck produced minimal cogwheeling. There were +1 carotid upstrokes without bruits. His lungs were clear to auscultation. Cardiac testing proved unrevealing. There were +1 peripheral pulses without bruits. His limbs displayed no abnormalities. Neurological examination produced an intact mental status. His speech remained soft, without dysarthria or aphasia. I again found no cognitive decline. Cranial nerve testing revealed a minimal mask facies, without Myerson's sign. Extraocular movements continued intact. He was minimally bradykinetic, with slight cogwheeling in both arms and wrists. He also displayed a slight, intermittent tremor of his right hand, when walking. Strength was 5/5 throughout. Reflexes were barely elicited throughout. All sensory modalities continued intact. Finger-to-nose and heel shin testings were performed well but slowly. He walked more quickly, without festination. He did not en bloc. His examination displayed moderate improvements. There were no recent laboratory data. This individual presents a 4 year history of slowing movements and tremors. His parkinsonism has improved with increasing doses of carbidopa/levodopa. However, entacapone has been unable to maintain dosing every 4 hours.   I will first add ropinirole XL 4 mg in the morning. If necessary, an additional 4 mg will be given in the evenings. Also, for his upcoming speeches, he may take 5/50 half tablet of carbidopa/levodopa, 1 hour before his presentations. He is otherwise stable neurologically. Peter Soni He continues well medically. He will return in 6 weeks, to see me. Entacapone was maintained. Ropinirole XL was added. He will report back in 3 to 4 weeks. He will call at any time if problems arise. I spent 40 minutes with the patient with over 50 % spent in counseling and disease management. All patient issues were addressed and all questions were answered.

## 2022-10-12 NOTE — PROGRESS NOTES
"10/12/2022       RE: Bharath Mercado  [address] [facility] [place] [address]     Dear Colleague,    Thank you for referring your patient, Bharath Mercado, to the [facility] [place] at [facility]. Please see a copy of my visit note below.      Assessment & Plan     Post-COVID chronic concentration deficit  Discussed possible causes of concentration deficits with patient.  Advised on chronic fatigue with an impact on cognitive function.  Recommend neuropsychology evaluation as patient feels that his issues are somewhat impacting his work.    - Adult Neuropsychology Referral; Future    Post-COVID chronic dyspnea  Reviewed gradual increase in exercise following COVID-19.  Given some degree of dyspnea, recommend pulmonary rehab.  Referral was placed today.  - Pulmonary Rehab Referral; Future      I spent a total of 40 minutes on the day of the visit.   Time spent doing chart review, history and exam, documentation and further activities per the note       BMI:   Estimated body mass index is 28.59 kg/m  as calculated from the following:    Height as of 9/7/22: 1.803 m (5' 11\").    Weight as of 9/7/22: 93 kg (205 lb).           Return in about 4 months (around 2/12/2023).    Clarisa Melara MD  [facility] [place]    Tremaine Grace is a 57 year old, presenting for the following health issues:  Video Visit    Video Visit      HPI     Patient is following up on post-COVID issues. He reports that he is doing a little better. He has been working with PT on strengthening exercises. He states that he is able to go on longer walks, able to use a bike. He has been seen by Neurology as well. Most of the testing has been normal. Mild neuropathy was seen in his upper extremities. He has been doing OK with work. He also feels that his anxiety is under better control. " Jose M Shah is a 61 y.o. right handed man    evaluated via telephone on 2/24/2021. Consent:  He and/or health care decision maker is aware that that he may receive a bill for this telephone service, depending on his insurance coverage, and has provided verbal consent to proceed: Yes    I affirm this is a Patient Initiated Episode with an Established Patient who has not had a related appointment within my department in the past 7 days or scheduled within the next 24 hours. The patient's identity was verified at the start of the call. Others involved in call: just pt     Total Time: minutes: 21-30 minutes    Consent:  The patient and/or health care decision maker is aware that that he may receive a bill for this telephone service, depending on his insurance coverage, and has provided verbal consent to proceed: Yes  Patient advised regarding steps to help prevent the spread of COVID-19   SOURCE - [email or web address] html     1-Stay home except to get medical care  2-Clean your hands often for atleast 20 secnds, avoid touching: Avoid touching your eyes, nose, and mouth with unwashed hands. 3-Seek medical attention: Seek prompt medical attention if your illness is worsening (e.g., difficulty breathing). Call you doctor first.  3-Wear a facemask if you are sick   4-Cover your coughs and sneezes     Note: not billable if this call serves to triage the patient into an appointment for the relevant concern    Patient was referred for a history of Parkinson's disease. States he was diagnosed 2-3 years ago -- found to be \"walking stiff\" by a  and went to see his Dr who referred him to [facility].   There, he was dx with idiopathic Parkinson's disease and started on Mirapex  He titrated with little effect -- in fact, it made him tired, irritable and did not seem to help his stiffness nor his tremor     They switched him to Azilect 1mg daily and he felt it was better He has noticed significant improvement in anxiety with Lexapro. He has noticed that keeping track of some things is still difficult.         Current concerns: Health Concerns    PHQ Assesment Total Score(s) 10/11/2022   PHQ-9 Score 3   Some recent data might be hidden     VIDA-7 Results 10/11/2022   VIDA 7 TOTAL SCORE 4 (minimal anxiety)   Some recent data might be hidden     PTSD Screen Score 10/11/2022   Have you ever experienced this kind of event? Yes   PTSD Screen (Score of 3 or more suggests positive screen) 3   Some recent data might be hidden     No flowsheet data found.          Past Medical History:   Diagnosis Date     Borderline hyperlipidemia      Hernia, abdominal     inguinal     Palpitations     intermittent PSVT       Past Surgical History:   Procedure Laterality Date     HC REPAIR ING HERNIA,5+Y/O,REDUCIBL      right inguinal     VASECTOMY       ZZC APPENDECTOMY         Family History   Problem Relation Age of Onset     Cerebrovascular Disease Mother      Cancer Father 68        bladder ca/lentigo maligna     C.A.D. Father 65        coronary stents     Cerebrovascular Disease Paternal Grandmother      Diabetes Paternal Grandmother         type 2     Diabetes Paternal Aunt         type 1, childhood onset     C.A.D. Paternal Aunt      Cancer - colorectal No family hx of      Prostate Cancer No family hx of        Social History     Tobacco Use     Smoking status: Never     Smokeless tobacco: Never   Substance Use Topics     Alcohol use: Yes     Alcohol/week: 0.0 standard drinks     Comment: 2 x week     Drug use: No         Current Outpatient Medications:      escitalopram (LEXAPRO) 10 MG tablet, Take 1 tablet (10 mg) by mouth daily, Disp: 90 tablet, Rfl: 3     multivitamin, therapeutic with minerals (THERA-VIT-M) TABS, Take 1 tablet by mouth daily, Disp: , Rfl:      sildenafil (REVATIO) 20 MG tablet, Take 1 tablet (20 mg) by mouth daily as needed (erectile dysfunction), Disp: 30 tablet, Rfl:  \"but not much\"  He feels it works better than Mirapex but still stiff, and still with tremor    We tried Neupro titrating to 8mg    He did note improvement in swinging his arms when he walks   He also notes his golf game seemed better on this medication     At a previous appt, he asked if there was something better and we discussed Sinemet but were hesitant d/t his age -- he reported [facility] felt the same   But, given his s/s and complaints, we decided to try a small dose of Sinemet just with breakfast and dinner     His wife called the week after with notes of weight loss -- he was losing 5# per week   He tells me that he was not upset with this (\"I needed to lose weight\") but states his wife was worried   We held the Sinemet and kept him just on Neupro 8mg daily     He asked to restart Sinemet but I remained hesitant d/t ill effects and his young age   However we did agree to try once daily and have him follow with Dr Amelie Hadley for possible alternatives   He agreed with Sinemet and felt the dose should be titrated therefore we have been doing so    now states he is having trouble with ED and now going to the bathroom   We discussed need for urology eval because though ED can be r/t PD his flow issues for urine should not be related      No falls   No trouble chewing or swallowing  Imaging reviewed from a few years ago     No headache  No stroke or seizure hx     No chest pain or palpitations  No SOB  No vertigo, lightheadedness or loss of consciousness  No falls, tripping or stumbling  No incontinence of bowels or bladder  No itching or bruising appreciated  No numbness, tingling or focal arm/leg weakness    ROS otherwise negative     Objective:     Mental Status: Alert, oriented to person place and year     Speech: clear without vocal quivering   Language: appropriate     Breathing seems unlabored       Laboratory/Radiology:     Recent labs per PCP -- none to personally review    MRI Brain 2009  Unremarkable     I 3    Review of Systems   Constitutional: Positive for fatigue. Negative for chills and fever.   HENT: Negative for ear discharge, ear pain, hearing loss, mouth sores, nosebleeds, sinus pain, sore throat, tinnitus and trouble swallowing.    Respiratory: Positive for shortness of breath. Negative for cough and wheezing.    Endocrine: Negative for polydipsia and polyphagia.   Genitourinary: Negative for penile discharge.   Musculoskeletal: Positive for arthralgias, myalgias and neck pain. Negative for back pain and joint swelling.   Skin: Positive for rash.   Neurological: Positive for weakness. Negative for dizziness, tremors, seizures, numbness and headaches.   Psychiatric/Behavioral: Positive for decreased concentration. Negative for hallucinations. The patient is nervous/anxious.            Objective           Vitals:  No vitals were obtained today due to virtual visit.    Physical Exam   GENERAL: Healthy, alert and no distress  EYES: Eyes grossly normal to inspection.  No discharge or erythema, or obvious scleral/conjunctival abnormalities.  RESP: No audible wheeze, cough, or visible cyanosis.  No visible retractions or increased work of breathing.    SKIN: Visible skin clear. No significant rash, abnormal pigmentation or lesions.  NEURO: Cranial nerves grossly intact.  Mentation and speech appropriate for age.  PSYCH: Mentation appears normal, affect normal/bright, judgement and insight intact, normal speech and appearance well-groomed.          Answers for HPI/ROS submitted by the patient on 10/11/2022  If you checked off any problems, how difficult have these problems made it for you to do your work, take care of things at home, or get along with other people?: Somewhat difficult  PHQ9 TOTAL SCORE: 3  VIDA 7 TOTAL SCORE: 4  General Symptoms: Yes  Skin Symptoms: Yes  HENT Symptoms: Yes  EYE SYMPTOMS: No  HEART SYMPTOMS: No  LUNG SYMPTOMS: Yes  INTESTINAL SYMPTOMS: No  URINARY SYMPTOMS: No  REPRODUCTIVE SYMPTOMS:  Yes  SKELETAL SYMPTOMS: Yes  BLOOD SYMPTOMS: No  NERVOUS SYSTEM SYMPTOMS: Yes  MENTAL HEALTH SYMPTOMS: Yes  Congestion: Yes   Voice hoarseness: No  Tooth pain: No  Gum tenderness: No  Bleeding gums: No  Change in taste: No  Change in sense of smell: No  Dry mouth: Yes  Hearing aid used: No  Neck lump: No  Loss of appetite: No  Weight loss: No  Weight gain: No  Night sweats: Yes  Increased stress: Yes  Feeling hot or cold when others believe the temperature is normal: No  Loss of height: No  Post-operative complications: No  Surgical site pain: No  Change in or Loss of Energy: No  Hyperactivity: No  Confusion: No  Changes in hair: No  Changes in moles/birth marks: No  Itching: Yes  Changes in nails: No  Acne: No  Change in facial hair: No  Warts: No  Non-healing sores: No  Scarring: No  Flaking of skin: Yes  Color changes of hands/feet in cold : No  Sun sensitivity: No  Skin thickening: No  Sputum or phlegm: No  Coughing up blood: No  Snoring: Yes  Difficulty breathing on exertion: Yes  Nighttime Cough: No  Difficulty breathing when lying flat: No  Scrotal pain or swelling: No  Erectile dysfunction: No  Genital ulcers: No  Reduced libido: Yes  Bone pain: No  Muscle cramps: No  Muscle weakness: Yes  Joint stiffness: No  Bone fracture: No  Trouble with coordination: No  Fainting or black-out spells: No  Memory loss: No  Speech problems: No  Tingling: No  Difficulty walking: Yes  Paralysis: No  Depression: No  Trouble sleeping: No  Mood changes: No  Panic attacks: Yes        Sincerely,    Clarisa Melara MD

## 2022-12-07 ENCOUNTER — OFFICE VISIT (OUTPATIENT)
Dept: NEUROLOGY | Age: 62
End: 2022-12-07
Payer: COMMERCIAL

## 2022-12-07 VITALS
RESPIRATION RATE: 18 BRPM | DIASTOLIC BLOOD PRESSURE: 75 MMHG | SYSTOLIC BLOOD PRESSURE: 122 MMHG | BODY MASS INDEX: 31.42 KG/M2 | TEMPERATURE: 96.2 F | HEIGHT: 72 IN | HEART RATE: 85 BPM | WEIGHT: 232 LBS | OXYGEN SATURATION: 96 %

## 2022-12-07 DIAGNOSIS — G20 PARKINSON'S DISEASE (HCC): Primary | ICD-10-CM

## 2022-12-07 PROCEDURE — 99215 OFFICE O/P EST HI 40 MIN: CPT | Performed by: PSYCHIATRY & NEUROLOGY

## 2022-12-07 PROCEDURE — 3074F SYST BP LT 130 MM HG: CPT | Performed by: PSYCHIATRY & NEUROLOGY

## 2022-12-07 PROCEDURE — 3078F DIAST BP <80 MM HG: CPT | Performed by: PSYCHIATRY & NEUROLOGY

## 2022-12-07 NOTE — PROGRESS NOTES
This 58year-old right-handed man was referred for evaluation and management of Parkinson's disease. He remained an excellent historian. He was again alone. His medications were now carbidopa/levodopa 25/250 tablets every 4 hours while awake, metoprolol, ropinirole extended release, hydrochlorothiazide and simvastatin. He was unable to afford the rotigotine patches. His medical history was remarkable for hypertension, hyperlipidemia, obstructive sleep apnea and degenerative joint disease. He again denied diabetes mellitus, strokes, seizures, lung disorders, heart disease, gastrointestinal issues or depression. Over 4 years ago, a colleague observed his slow walking. He was hunched over. He was unaware of these issues himself, until months later. He then realized he was not moving his arms while walking. Over 2 years ago, his right hand began shaking, especially when walking. These deficits gradually worsened. He was diagnosed with Parkinson's disease. He noted improvement with increasing doses of carbidopa/levodopa, as well as rotigotine. On his last visit, carbidopa/levodopa was increased to 25/250 tablets. Unfortunately, rotigotine was discontinued because as this insurance denied approval of that drug. Subsequently, he was slower. He noted carbidopa/levodopa and entacapone did not last for hours. He was, therefore, placed on ropinirole extended release formulation 4 mg daily. He found the medication helpful. He did not renew the prescription, noting more slowing. He again denied chewing difficulties. He was not drooling. He was not falling or tripping. There were rare swallowing difficulties. He denied headaches, but minimal neck and back pains. There was no weakness, sensory loss, clumsiness, memory loss, seizure-like activity or loss of consciousness. He questioned whether he suffered from multisystem atrophy.   I informed him he did not have that disorder, which would not respond to any doses of carbidopa/levodopa or other parkinsonian medications. He spoke well at a recent conference in [place], using one half tablet of carbidopa/levodopa 10/10 one hour before the speech. He noted intermittent numbness and burning sensations in both feet, more so the left. He also reported intermittent low back pains, without radiation. He again denied any medical issues. He was sleeping and eating moderately well. He was exercising more, despite pains in both knees. These discomforts improved using topical gels. He received his COVID-19 vaccinations and boosters. Review of systems was otherwise unremarkable. Physical examination displayed stable vital signs. Blood pressure was 122/75. He was an elderly man in no acute distress, who was alert, cooperative and oriented. He remained pleasant. His skin was unremarkable. Head examination displayed a small posterior pharyngeal aperture. His neck produced minimal cogwheeling. There were +1 carotid upstrokes without bruits. His lungs were clear to auscultation. Cardiac testing proved unrevealing. There were +1 peripheral pulses without bruits. His limbs displayed no abnormalities. Neurological examination produced an intact mental status. His speech remained soft, without dysarthria or aphasia. I reached out found no cognitive decline. Cranial nerve testing revealed a minimal mask facies, without Myerson's sign. Extraocular movements remained intact. He was more bradykinetic, with slight cogwheeling in both arms and wrists. He again displayed a slight, intermittent tremor of his right hand, when walking. Strength was 5/5 throughout. Reflexes were barely elicited throughout. All sensory modalities continued intact. Finger-to-nose and heel shin testings were performed well but slowly. He walked more quickly, without festination. He did not en bloc.     His examination displayed more bradykinesia, often ropinirole. There were no recent laboratory data. This individual presents a 4 year history of slowing movements and tremors. His parkinsonism improved with increasing doses of carbidopa/levodopa and ropinirole. He will resume ropinirole extended release 4 mg every morning. If necessary, that medication will be increased to twice daily. I will first add ropinirole XL 4 mg twice daily. He may take 5/50 half tablet of carbidopa/levodopa, 1 hour before any public presentations. The dysesthesias of his feet may be related to Parkinson's disease or from lumbosacral radicular disease. If these worsen, electrodiagnostic studies will be considered. He is otherwise stable neurologically. Shankar tSokes He continues well medically. He will return in 4 months, to see Dr. Franny Trevino. He will continue his current treatment regimen, including ropinirole XL 4 mg twice daily. He will call at any time if problems arise. I spent 40 minutes with the patient with over 50 % spent in counseling and disease management. All patient issues were addressed and all questions were answered.

## 2023-03-07 ENCOUNTER — OFFICE VISIT (OUTPATIENT)
Dept: NEUROLOGY | Age: 63
End: 2023-03-07
Payer: COMMERCIAL

## 2023-03-07 VITALS
SYSTOLIC BLOOD PRESSURE: 117 MMHG | HEIGHT: 72 IN | DIASTOLIC BLOOD PRESSURE: 85 MMHG | HEART RATE: 65 BPM | BODY MASS INDEX: 32.51 KG/M2 | WEIGHT: 240 LBS | TEMPERATURE: 98.2 F | OXYGEN SATURATION: 98 %

## 2023-03-07 DIAGNOSIS — G20 PARKINSON'S DISEASE (HCC): Primary | ICD-10-CM

## 2023-03-07 PROCEDURE — 3079F DIAST BP 80-89 MM HG: CPT | Performed by: CLINICAL NURSE SPECIALIST

## 2023-03-07 PROCEDURE — 99214 OFFICE O/P EST MOD 30 MIN: CPT | Performed by: CLINICAL NURSE SPECIALIST

## 2023-03-07 PROCEDURE — 3074F SYST BP LT 130 MM HG: CPT | Performed by: CLINICAL NURSE SPECIALIST

## 2023-03-07 RX ORDER — CARBIDOPA/LEVODOPA 25MG-250MG
1 TABLET ORAL
Qty: 120 TABLET | Refills: 11 | Status: SHIPPED | OUTPATIENT
Start: 2023-03-07

## 2023-03-07 RX ORDER — ENTACAPONE 200 MG/1
200 TABLET ORAL
Qty: 120 TABLET | Refills: 11 | Status: SHIPPED | OUTPATIENT
Start: 2023-03-07 | End: 2024-03-06

## 2023-03-07 RX ORDER — ROPINIROLE 4 MG/1
4 TABLET, FILM COATED, EXTENDED RELEASE ORAL 2 TIMES DAILY
Qty: 60 TABLET | Refills: 5 | Status: SHIPPED | OUTPATIENT
Start: 2023-03-07 | End: 2023-09-03

## 2023-03-07 NOTE — PROGRESS NOTES
Donis Boas is a 58 y.o. right handed man    Patient was referred for a history of Parkinson's disease. Previously evaluated by myself but most recently had been following with Dr. Cleo Quach he was evaluated at [facility] [place] initially and was dx with idiopathic Parkinson's disease and started on Mirapex    He titrated with little effect -- in fact, it made him tired, irritable and did not seem to help his stiffness nor his tremor   They switched him to Azilect 1mg daily and he felt it was better \"but not much\"  He feels it works better than Mirapex but still stiff, and still with tremor    We tried Neupro titrating to 8mg in the past     He was subsequently switched from Neupro to carbidopa/levodopa titrating to 25/250 every 4 hours while awake  He is also now taking Comtan 200 mg with each dose  As well as Requip XL 4 mg twice a day    Occasionally she will use a half of 10/100 tablet before presentations or going into public    Feels he is doing very well but we did discuss other options such as Clotilde Amor or Connor Irwin -hesitant to make any changes at this time  He does report off periods prior to next dose -we even discussed dosing earlier than every 4 hours    States his only form of exercise is walking the dog we did encourage increased activity.   He did however state that increasing his activity would cut down on his time playing [employer] football on his PlayStation    No chest pain or palpitations  No SOB  No vertigo, lightheadedness or loss of consciousness  No falls, tripping or stumbling  No incontinence of bowels or bladder  No itching or bruising appreciated  ROS otherwise negative     Objective:     /85   Pulse 65   Temp 98.2 °F (36.8 °C)   Ht 6' (1.829 m)   Wt 240 lb (108.9 kg)   SpO2 98%   BMI 32.55 kg/m²      General appearance: alert, appears stated age and cooperative  Head: Normocephalic, without obvious abnormality, atraumatic  Neck: limited ROM  Extremities: no cyanosis or edema  Pulses: 2+ and symmetric  Skin: no rashes or lesions     Mental Status: Alert, oriented to person place and year     Speech: clear  Language: appropriate     Mask-like facies  No Myerson's sign     Cranial Nerves:  I: smell    II: visual acuity     II: visual fields Full    II: pupils ESTELA   III,VII: ptosis None   III,IV,VI: extraocular muscles  EOMI without nystagmus - coarse saccadic pursuits    V: mastication Normal   V: facial light touch sensation     V,VII: corneal reflex  Present   VII: facial muscle function - upper     VII: facial muscle function - lower Normal   VIII: hearing Normal   IX: soft palate elevation  Normal   IX,X: gag reflex    XI: trapezius strength  5/5   XI: sternocleidomastoid strength 5/5   XI: neck extension strength  5/5   XII: tongue strength  Normal     Motor:  5/5 throughout  Normal bulk and tone     Moderate bradykinesia  No resting tremor right hand today  Cogwheel rigidity noted in elbows and wrists bilaterally     No tremor with outstretched hands  No ataxic tremor     Sensory:  LT normal  Vibration normal     Coordination:   FN, FFM and CARLEY normal    Gait:  Normal   No [place]'s appreciated today    DTR:   No reflexes    No Victoria's     Laboratory/Radiology:     Recent labs per PCP -- none to personally review    MRI Brain 2009  Unremarkable     I independently reviewed the labs and imaging studies today.      Assessment:     Idiopathic Parkinson's disease   Responded well to Sinemet but now continues to take Sinemet/Comtan/Requip XL    Plan:     Increase activity    Continue same medications    RTO 4 months    Albin Crigler, APRN - CNS  12:03 PM  3/7/2023

## 2023-09-22 ENCOUNTER — OFFICE VISIT (OUTPATIENT)
Dept: NEUROLOGY | Age: 63
End: 2023-09-22
Payer: COMMERCIAL

## 2023-09-22 VITALS
BODY MASS INDEX: 32.55 KG/M2 | WEIGHT: 240 LBS | OXYGEN SATURATION: 96 % | TEMPERATURE: 97.2 F | DIASTOLIC BLOOD PRESSURE: 92 MMHG | SYSTOLIC BLOOD PRESSURE: 144 MMHG | HEART RATE: 66 BPM

## 2023-09-22 DIAGNOSIS — G20 PARKINSON'S DISEASE (HCC): Primary | ICD-10-CM

## 2023-09-22 PROCEDURE — 3077F SYST BP >= 140 MM HG: CPT | Performed by: CLINICAL NURSE SPECIALIST

## 2023-09-22 PROCEDURE — 3080F DIAST BP >= 90 MM HG: CPT | Performed by: CLINICAL NURSE SPECIALIST

## 2023-09-22 PROCEDURE — 99214 OFFICE O/P EST MOD 30 MIN: CPT | Performed by: CLINICAL NURSE SPECIALIST

## 2023-09-22 NOTE — PROGRESS NOTES
to person place and year     Speech: clear  Language: appropriate     Mask-like facies  No Myerson's sign     Cranial Nerves:  I: smell    II: visual acuity     II: visual fields Full    II: pupils ESTELA   III,VII: ptosis None   III,IV,VI: extraocular muscles  EOMI without nystagmus - coarse saccadic pursuits    V: mastication Normal   V: facial light touch sensation     V,VII: corneal reflex  Present   VII: facial muscle function - upper     VII: facial muscle function - lower Normal   VIII: hearing Normal   IX: soft palate elevation  Normal   IX,X: gag reflex    XI: trapezius strength  5/5   XI: sternocleidomastoid strength 5/5   XI: neck extension strength  5/5   XII: tongue strength  Normal     Motor:  5/5 throughout  Normal bulk and tone     Minimal bradykinesia cruciated today  No resting tremor right hand today  Cogwheel rigidity noted in elbows and wrists bilaterally     No tremor with outstretched hands  No ataxic tremor     Sensory:  LT normal  Vibration normal     Coordination:   FN, FFM and CARLEY normal    Gait:  Normal   No [place]'s appreciated today    DTR:   No reflexes    No Victoria's     Laboratory/Radiology:     Recent labs per PCP -- none to personally review    MRI Brain 2009  Unremarkable     I independently reviewed the labs and imaging studies today.      Assessment:     Idiopathic Parkinson's disease   Responded well to Sinemet but now continues to take Sinemet/Comtan/Requip XL    Plan:     Increase activity    No napping at work-or go to bed earlier    Continue same medications    RTO 4 months    RAFA Boston - CNS  11:19 AM  9/22/2023

## 2023-09-25 RX ORDER — ROPINIROLE 4 MG/1
TABLET, FILM COATED, EXTENDED RELEASE ORAL
Qty: 60 TABLET | Refills: 0 | Status: SHIPPED | OUTPATIENT
Start: 2023-09-25

## 2023-11-01 RX ORDER — ROPINIROLE 4 MG/1
TABLET, FILM COATED, EXTENDED RELEASE ORAL
Qty: 60 TABLET | Refills: 0 | Status: SHIPPED | OUTPATIENT
Start: 2023-11-01

## 2024-05-13 ENCOUNTER — APPOINTMENT (OUTPATIENT)
Dept: GENERAL RADIOLOGY | Age: 64
End: 2024-05-13
Payer: COMMERCIAL

## 2024-05-13 ENCOUNTER — HOSPITAL ENCOUNTER (EMERGENCY)
Age: 64
Discharge: HOME | End: 2024-05-13
Attending: EMERGENCY MEDICINE
Payer: COMMERCIAL

## 2024-05-13 ENCOUNTER — TELEPHONE (OUTPATIENT)
Dept: NEUROLOGY | Age: 64
End: 2024-05-13

## 2024-05-13 VITALS
DIASTOLIC BLOOD PRESSURE: 87 MMHG | SYSTOLIC BLOOD PRESSURE: 153 MMHG | TEMPERATURE: 98.3 F | OXYGEN SATURATION: 98 % | HEART RATE: 72 BPM | RESPIRATION RATE: 18 BRPM | WEIGHT: 223 LBS | BODY MASS INDEX: 34.93 KG/M2

## 2024-05-13 DIAGNOSIS — G20.A1 PARKINSON'S DISEASE, UNSPECIFIED WHETHER DYSKINESIA PRESENT, UNSPECIFIED WHETHER MANIFESTATIONS FLUCTUATE (HCC): ICD-10-CM

## 2024-05-13 DIAGNOSIS — F41.1 ANXIETY STATE: Primary | ICD-10-CM

## 2024-05-13 LAB
ALBUMIN SERPL-MCNC: 3.9 G/DL (ref 3.5–5.2)
ALP SERPL-CCNC: 77 U/L (ref 40–129)
ALT SERPL-CCNC: 6 U/L (ref 0–40)
ANION GAP SERPL CALCULATED.3IONS-SCNC: 8 MMOL/L (ref 7–16)
AST SERPL-CCNC: 24 U/L (ref 0–39)
BASOPHILS # BLD: 0.02 K/UL (ref 0–0.2)
BASOPHILS NFR BLD: 0 % (ref 0–2)
BILIRUB SERPL-MCNC: 0.4 MG/DL (ref 0–1.2)
BUN SERPL-MCNC: 19 MG/DL (ref 6–23)
CALCIUM SERPL-MCNC: 8.9 MG/DL (ref 8.6–10.2)
CHLORIDE SERPL-SCNC: 108 MMOL/L (ref 98–107)
CO2 SERPL-SCNC: 26 MMOL/L (ref 22–29)
CREAT SERPL-MCNC: 0.9 MG/DL (ref 0.7–1.2)
EKG ATRIAL RATE: 75 BPM
EKG P AXIS: 47 DEGREES
EKG P-R INTERVAL: 260 MS
EKG Q-T INTERVAL: 384 MS
EKG QRS DURATION: 78 MS
EKG QTC CALCULATION (BAZETT): 428 MS
EKG R AXIS: 80 DEGREES
EKG T AXIS: 33 DEGREES
EKG VENTRICULAR RATE: 75 BPM
EOSINOPHIL # BLD: 0.11 K/UL (ref 0.05–0.5)
EOSINOPHILS RELATIVE PERCENT: 2 % (ref 0–6)
ERYTHROCYTE [DISTWIDTH] IN BLOOD BY AUTOMATED COUNT: 12.7 % (ref 11.5–15)
GFR, ESTIMATED: >90 ML/MIN/1.73M2
GLUCOSE SERPL-MCNC: 110 MG/DL (ref 74–99)
HCT VFR BLD AUTO: 42 % (ref 37–54)
HGB BLD-MCNC: 14.2 G/DL (ref 12.5–16.5)
IMM GRANULOCYTES # BLD AUTO: 0.03 K/UL (ref 0–0.58)
IMM GRANULOCYTES NFR BLD: 0 % (ref 0–5)
INR PPP: 1.1
LYMPHOCYTES NFR BLD: 1.61 K/UL (ref 1.5–4)
LYMPHOCYTES RELATIVE PERCENT: 23 % (ref 20–42)
MAGNESIUM SERPL-MCNC: 2.2 MG/DL (ref 1.6–2.6)
MCH RBC QN AUTO: 27.8 PG (ref 26–35)
MCHC RBC AUTO-ENTMCNC: 33.8 G/DL (ref 32–34.5)
MCV RBC AUTO: 82.4 FL (ref 80–99.9)
MONOCYTES NFR BLD: 0.71 K/UL (ref 0.1–0.95)
MONOCYTES NFR BLD: 10 % (ref 2–12)
NEUTROPHILS NFR BLD: 65 % (ref 43–80)
NEUTS SEG NFR BLD: 4.69 K/UL (ref 1.8–7.3)
PLATELET # BLD AUTO: 174 K/UL (ref 130–450)
PMV BLD AUTO: 10.7 FL (ref 7–12)
POTASSIUM SERPL-SCNC: 4 MMOL/L (ref 3.5–5)
PROT SERPL-MCNC: 7 G/DL (ref 6.4–8.3)
PROTHROMBIN TIME: 12.4 SEC (ref 9.3–12.4)
RBC # BLD AUTO: 5.1 M/UL (ref 3.8–5.8)
SODIUM SERPL-SCNC: 142 MMOL/L (ref 132–146)
TROPONIN I SERPL HS-MCNC: 13 NG/L (ref 0–11)
TROPONIN I SERPL HS-MCNC: 14 NG/L (ref 0–11)
WBC OTHER # BLD: 7.2 K/UL (ref 4.5–11.5)

## 2024-05-13 PROCEDURE — 84484 ASSAY OF TROPONIN QUANT: CPT

## 2024-05-13 PROCEDURE — 83735 ASSAY OF MAGNESIUM: CPT

## 2024-05-13 PROCEDURE — 71046 X-RAY EXAM CHEST 2 VIEWS: CPT

## 2024-05-13 PROCEDURE — 85610 PROTHROMBIN TIME: CPT

## 2024-05-13 PROCEDURE — 85025 COMPLETE CBC W/AUTO DIFF WBC: CPT

## 2024-05-13 PROCEDURE — 93005 ELECTROCARDIOGRAM TRACING: CPT | Performed by: EMERGENCY MEDICINE

## 2024-05-13 PROCEDURE — 80053 COMPREHEN METABOLIC PANEL: CPT

## 2024-05-13 PROCEDURE — 99285 EMERGENCY DEPT VISIT HI MDM: CPT

## 2024-05-13 PROCEDURE — 93010 ELECTROCARDIOGRAM REPORT: CPT | Performed by: INTERNAL MEDICINE

## 2024-05-13 RX ORDER — LORAZEPAM 1 MG/1
1 TABLET ORAL EVERY 6 HOURS PRN
Qty: 10 TABLET | Refills: 0 | Status: SHIPPED | OUTPATIENT
Start: 2024-05-13 | End: 2024-06-12

## 2024-05-13 RX ORDER — PREDNISONE 20 MG/1
TABLET ORAL
Qty: 24 TABLET | Refills: 0 | Status: SHIPPED | OUTPATIENT
Start: 2024-05-13

## 2024-05-13 ASSESSMENT — LIFESTYLE VARIABLES
HOW MANY STANDARD DRINKS CONTAINING ALCOHOL DO YOU HAVE ON A TYPICAL DAY: 1 OR 2
HOW OFTEN DO YOU HAVE A DRINK CONTAINING ALCOHOL: MONTHLY OR LESS

## 2024-05-13 NOTE — ED PROVIDER NOTES
2.0 - 12.0 %    Eosinophils % 2 0 - 6 %    Basophils % 0 0.0 - 2.0 %    Immature Granulocytes % 0 0.0 - 5.0 %    Neutrophils Absolute 4.69 1.80 - 7.30 k/uL    Lymphocytes Absolute 1.61 1.50 - 4.00 k/uL    Monocytes Absolute 0.71 0.10 - 0.95 k/uL    Eosinophils Absolute 0.11 0.05 - 0.50 k/uL    Basophils Absolute 0.02 0.00 - 0.20 k/uL    Immature Granulocytes Absolute 0.03 0.00 - 0.58 k/uL   Magnesium   Result Value Ref Range    Magnesium 2.2 1.6 - 2.6 mg/dL   Protime-INR   Result Value Ref Range    Protime 12.4 9.3 - 12.4 sec    INR 1.1    Troponin   Result Value Ref Range    Troponin, High Sensitivity 14 (H) 0 - 11 ng/L   Troponin   Result Value Ref Range    Troponin, High Sensitivity 13 (H) 0 - 11 ng/L   EKG 12 Lead   Result Value Ref Range    Ventricular Rate 75 BPM    Atrial Rate 75 BPM    P-R Interval 260 ms    QRS Duration 78 ms    Q-T Interval 384 ms    QTc Calculation (Bazett) 428 ms    P Axis 47 degrees    R Axis 80 degrees    T Axis 33 degrees       RADIOLOGY:  Interpreted by Radiologist.  XR CHEST (2 VW)   Final Result   No acute cardiopulmonary disease.             EKG:  This EKG is signed and interpreted by the EP.    Time: 1005  Rate: 75  Rhythm: Sinus  Interpretation: non-specific EKG  Comparison: None      ------------------------- NURSING NOTES AND VITALS REVIEWED ---------------------------   The nursing notes within the ED encounter and vital signs as below have been reviewed by myself.  BP (!) 153/87   Pulse 72   Temp 98.3 °F (36.8 °C) (Oral)   Resp 18   Wt 101.2 kg (223 lb)   SpO2 98%   BMI 34.93 kg/m²   Oxygen Saturation Interpretation: Normal    The patient’s available past medical records and past encounters were reviewed.        ------------------------------ ED COURSE/MEDICAL DECISION MAKING----------------------  Medications - No data to display      ED COURSE:             This patient's ED course included:     This patient has remained hemodynamically stable during their ED

## 2024-05-13 NOTE — TELEPHONE ENCOUNTER
Pt was seen in er 5-13-24 for Parkinson's, needs follow up. No openings were available. Please call pt back. Thanks!

## 2024-06-13 NOTE — PROGRESS NOTES
Quirino Montalvo is a 63 y.o. right handed man    Patient was referred for a history of Parkinson's disease.  Previously evaluated by myself but most recently had been following with Dr. Isaac     he was evaluated at [facility] initially and was dx with idiopathic Parkinson's disease and started on Mirapex    He titrated with little effect -- in fact, it made him tired, irritable and did not seem to help his stiffness nor his tremor   They switched him to Azilect 1mg daily and he felt it was better \"but not much\"  He feels it works better than Mirapex but still stiff, and still with tremor    We tried Neupro titrating to 8mg in the past     He was subsequently switched from Neupro to carbidopa/levodopa titrating to 25/250 every 4 hours while awake  He is also now taking Comtan 200 mg with each dose  He was using Requip XL 4 mg twice a day -usually around 11 AM and 3 PM but states he has not received this medication in a number of months and he was unconvinced of any benefit  He does note that his feeling terrible between the hours of 11 and 3 essentially started after he was no longer able to get Neupro patches   Again he previously failed Requip Requip XL, Mirapex, Azilect but previously responded to Neupro    Occasionally she will use a half of 10/100 tablet before presentations or going into public he reports this is minimally effective    He was reporting that around 12:00 he goes to lunch and at 1230 he takes a nap.  Upon awakening at 1 PM he states he feels as if he is in a fog until 3 PM and then he seems to come out of it and he does some of his best work shortly afterwards.  He reports that he goes to bed at midnight wakes up at 5 AM  We did discuss REM sleep cycles and alternatives to either napping during the day or going to bed earlier    He no longer naps at work but does feel that he has been spacing off    He also notes increasing lightheadedness and feelings as if he is going to pass out-blood pressure

## 2024-06-14 ENCOUNTER — OFFICE VISIT (OUTPATIENT)
Dept: NEUROLOGY | Age: 64
End: 2024-06-14
Payer: COMMERCIAL

## 2024-06-14 VITALS
SYSTOLIC BLOOD PRESSURE: 123 MMHG | TEMPERATURE: 98.1 F | OXYGEN SATURATION: 97 % | HEART RATE: 79 BPM | BODY MASS INDEX: 34.93 KG/M2 | WEIGHT: 223 LBS | DIASTOLIC BLOOD PRESSURE: 79 MMHG

## 2024-06-14 DIAGNOSIS — G20.B2 PARKINSON'S DISEASE WITH DYSKINESIA AND FLUCTUATING MANIFESTATIONS (HCC): Primary | ICD-10-CM

## 2024-06-14 PROCEDURE — 99214 OFFICE O/P EST MOD 30 MIN: CPT | Performed by: CLINICAL NURSE SPECIALIST

## 2024-06-14 PROCEDURE — 3074F SYST BP LT 130 MM HG: CPT | Performed by: CLINICAL NURSE SPECIALIST

## 2024-06-14 PROCEDURE — 3078F DIAST BP <80 MM HG: CPT | Performed by: CLINICAL NURSE SPECIALIST

## 2024-06-14 RX ORDER — ROTIGOTINE 2 MG/24H
1 PATCH, EXTENDED RELEASE TRANSDERMAL DAILY
Qty: 30 PATCH | Refills: 2 | Status: SHIPPED | OUTPATIENT
Start: 2024-06-14

## 2024-06-14 RX ORDER — CITALOPRAM 20 MG/1
20 TABLET ORAL DAILY
COMMUNITY
Start: 2024-06-11

## 2024-11-01 NOTE — PROGRESS NOTES
Outpatient EMG/NCV study completed. Report will be forwarded upon completion.    Jagdeep Hawley
10 (severe pain)

## 2025-02-05 ENCOUNTER — TELEPHONE (OUTPATIENT)
Dept: NEUROLOGY | Age: 65
End: 2025-02-05

## 2025-02-05 NOTE — TELEPHONE ENCOUNTER
Pt spouse called to report unusual symptoms/behaviors    Pt c/o burning sensation on the top of his head, describes it as a bug got into his hair and bit/stung him, he is smelling \"something bad\", vertigo, tremors, general weakness that is worsening and pt is barely able to stand.     Experiencing urinary incontinence will decreased stream while using the restroom, spouse states that penis has shrunk and she believes that scrotum is swelled almost double it's usual size. Spouse is unsure if the appearance of testicles seemed so different because his penis has \"shrunk\" so much.    Pt woke up with facial numbness this morning that quickly resolved.     Spouse wanted symptoms documented in chart for review and future visits. There has been no change with new medications.

## 2025-04-25 ENCOUNTER — HOSPITAL ENCOUNTER (OUTPATIENT)
Age: 65
Discharge: HOME | End: 2025-04-25
Payer: COMMERCIAL

## 2025-04-25 DIAGNOSIS — G20.A2 PARKINSON'S DISEASE WITHOUT DYSKINESIA, WITH FLUCTUATING MANIFESTATIONS (HCC): ICD-10-CM

## 2025-04-25 LAB
25(OH)D3 SERPL-MCNC: 22.8 NG/ML (ref 30–100)
CK SERPL-CCNC: 183 U/L (ref 0–190)
FOLATE SERPL-MCNC: 14.8 NG/ML (ref 4.6–34.8)
PSA SERPL-MCNC: 4.69 NG/ML (ref 0–4)
T4 FREE SERPL-MCNC: 1.2 NG/DL (ref 0.9–1.7)
TSH SERPL DL<=0.05 MIU/L-ACNC: 1.12 UIU/ML (ref 0.27–4.2)
VIT B12 SERPL-MCNC: 624 PG/ML (ref 232–1245)

## 2025-04-25 PROCEDURE — 82306 VITAMIN D 25 HYDROXY: CPT

## 2025-04-25 PROCEDURE — G0103 PSA SCREENING: HCPCS

## 2025-04-25 PROCEDURE — 84425 ASSAY OF VITAMIN B-1: CPT

## 2025-04-25 PROCEDURE — 82550 ASSAY OF CK (CPK): CPT

## 2025-04-25 PROCEDURE — 84443 ASSAY THYROID STIM HORMONE: CPT

## 2025-04-25 PROCEDURE — 82175 ASSAY OF ARSENIC: CPT

## 2025-04-25 PROCEDURE — 36415 COLL VENOUS BLD VENIPUNCTURE: CPT

## 2025-04-25 PROCEDURE — 83825 ASSAY OF MERCURY: CPT

## 2025-04-25 PROCEDURE — 84439 ASSAY OF FREE THYROXINE: CPT

## 2025-04-25 PROCEDURE — 82746 ASSAY OF FOLIC ACID SERUM: CPT

## 2025-04-25 PROCEDURE — 84207 ASSAY OF VITAMIN B-6: CPT

## 2025-04-25 PROCEDURE — 83655 ASSAY OF LEAD: CPT

## 2025-04-25 PROCEDURE — 82607 VITAMIN B-12: CPT

## 2025-04-27 LAB
ARSENIC BLD-MCNC: <10 UG/L
LEAD BLDV-MCNC: <2 UG/DL
MERCURY BLD-MCNC: <2.5 UG/L

## 2025-04-29 LAB — PYRIDOXAL PHOS SERPL-SCNC: 66.3 NMOL/L (ref 20–125)

## 2025-04-30 LAB — VIT B1 PYROPHOSHATE BLD-SCNC: 117 NMOL/L (ref 70–180)

## 2025-05-15 ENCOUNTER — HOSPITAL ENCOUNTER (OUTPATIENT)
Dept: MRI IMAGING | Age: 65
Discharge: HOME | End: 2025-05-17
Payer: COMMERCIAL

## 2025-05-15 DIAGNOSIS — G20.A2 PARKINSON'S DISEASE WITHOUT DYSKINESIA, WITH FLUCTUATING MANIFESTATIONS (HCC): ICD-10-CM

## 2025-05-15 PROCEDURE — 70553 MRI BRAIN STEM W/O & W/DYE: CPT

## 2025-05-15 PROCEDURE — A9577 INJ MULTIHANCE: HCPCS | Performed by: RADIOLOGY

## 2025-05-15 PROCEDURE — 6360000004 HC RX CONTRAST MEDICATION: Performed by: RADIOLOGY

## 2025-05-15 RX ADMIN — GADOBENATE DIMEGLUMINE 18 ML: 529 INJECTION, SOLUTION INTRAVENOUS at 09:32

## 2025-05-28 DIAGNOSIS — R42 VERTIGO: Primary | ICD-10-CM

## 2025-05-29 ENCOUNTER — RESULTS FOLLOW-UP (OUTPATIENT)
Age: 65
End: 2025-05-29

## 2025-05-29 DIAGNOSIS — G20.A2 PARKINSON'S DISEASE WITHOUT DYSKINESIA, WITH FLUCTUATING MANIFESTATIONS (HCC): Primary | ICD-10-CM

## 2025-05-29 NOTE — TELEPHONE ENCOUNTER
----- Message from Gustavo TINAJERO sent at 5/28/2025  7:33 AM EDT -----  Cn you let them know based on the MRI, I want to get some arterial studies -- ordered  ----- Message -----  From: Lew Mo Incoming Radiant Results From [facility]  Sent: 5/27/2025  10:45 PM EDT  To: Gustavo Rodriguez, RAFA - CNS

## 2025-06-02 ENCOUNTER — HOSPITAL ENCOUNTER (OUTPATIENT)
Dept: CT IMAGING | Age: 65
Discharge: HOME | End: 2025-06-04
Payer: COMMERCIAL

## 2025-06-02 ENCOUNTER — HOSPITAL ENCOUNTER (OUTPATIENT)
Age: 65
Discharge: HOME | End: 2025-06-02
Payer: COMMERCIAL

## 2025-06-02 DIAGNOSIS — R42 VERTIGO: ICD-10-CM

## 2025-06-02 DIAGNOSIS — G20.A2 PARKINSON'S DISEASE WITHOUT DYSKINESIA, WITH FLUCTUATING MANIFESTATIONS (HCC): ICD-10-CM

## 2025-06-02 LAB
ANION GAP SERPL CALCULATED.3IONS-SCNC: 11 MMOL/L (ref 7–16)
BUN SERPL-MCNC: 19 MG/DL (ref 8–23)
CALCIUM SERPL-MCNC: 9.9 MG/DL (ref 8.8–10.2)
CHLORIDE SERPL-SCNC: 103 MMOL/L (ref 98–107)
CO2 SERPL-SCNC: 27 MMOL/L (ref 22–29)
CREAT SERPL-MCNC: 0.9 MG/DL (ref 0.7–1.2)
GFR, ESTIMATED: 90 ML/MIN/1.73M2
GLUCOSE SERPL-MCNC: 91 MG/DL (ref 74–99)
POTASSIUM SERPL-SCNC: 3.7 MMOL/L (ref 3.5–5.1)
SODIUM SERPL-SCNC: 141 MMOL/L (ref 136–145)

## 2025-06-02 PROCEDURE — 80048 BASIC METABOLIC PNL TOTAL CA: CPT

## 2025-06-02 PROCEDURE — 70496 CT ANGIOGRAPHY HEAD: CPT

## 2025-06-02 PROCEDURE — 70498 CT ANGIOGRAPHY NECK: CPT

## 2025-06-02 PROCEDURE — 36415 COLL VENOUS BLD VENIPUNCTURE: CPT

## 2025-06-02 PROCEDURE — 6360000004 HC RX CONTRAST MEDICATION: Performed by: RADIOLOGY

## 2025-06-02 RX ORDER — IOPAMIDOL 755 MG/ML
60 INJECTION, SOLUTION INTRAVASCULAR
Status: COMPLETED | OUTPATIENT
Start: 2025-06-02 | End: 2025-06-02

## 2025-06-02 RX ADMIN — IOPAMIDOL 60 ML: 755 INJECTION, SOLUTION INTRAVENOUS at 13:16

## 2025-06-05 ENCOUNTER — TELEPHONE (OUTPATIENT)
Age: 65
End: 2025-06-05

## 2025-06-05 NOTE — TELEPHONE ENCOUNTER
Patient calling to speak to the office regarding his results of his CTA neck and head. Please contact pt.

## 2025-06-12 ENCOUNTER — TELEPHONE (OUTPATIENT)
Dept: NEUROLOGY | Age: 65
End: 2025-06-12

## 2025-06-12 NOTE — TELEPHONE ENCOUNTER
Plan:      Laboratory data will be obtained-wife given prescription     Given as above neurological issues will obtain MRI of the brain     Given his staring spells though unlikely related to seizures will obtain EEG     Have him reevaluated by movement disorder at [facility] for further medication recommendations or option     RAFA Shanks - CNS  2:31 PM  4/25/2025

## 2025-06-18 ENCOUNTER — SCHEDULED TELEPHONE ENCOUNTER (OUTPATIENT)
Age: 65
End: 2025-06-18
Payer: COMMERCIAL

## 2025-06-18 DIAGNOSIS — G20.A2 PARKINSON'S DISEASE WITHOUT DYSKINESIA, WITH FLUCTUATING MANIFESTATIONS (HCC): Primary | ICD-10-CM

## 2025-06-18 PROCEDURE — 99214 OFFICE O/P EST MOD 30 MIN: CPT | Performed by: CLINICAL NURSE SPECIALIST

## 2025-06-18 NOTE — PROGRESS NOTES
MRI Brain 2025  1. Tortuous left vertebral artery exerts mass effect on the midline ventral medulla, which may be associated with the clinical presentation of dizziness, vertigo, imbalance, and ataxia.  2. Minimal chronic microvascular ischemic changes.    CTA  1. No acute intracranial findings.  2. Unremarkable CTA of the head and neck.    I independently reviewed the labs and imaging studies today.     Assessment:     Idiopathic Parkinson's disease-previously diagnosed at [facility] however I have been increasingly concerned with Parkinson's plus syndrome given his orthostatic hypotension as well as underlying cognitive difficulties such as Parkinson's disease dementia.   Responded well to Sinemet in the past but now continues to take Sinemet/Comtan    Though I would prefer him to be on CR formulation   Previously failing Requip Requip XL Mirapex Azilect but previously responded to Neupro    Recurrent orthostatic hypotension-possibly related to autonomic dysfunction in the setting of Parkinson's disease versus carbidopa/levodopa  I also question if his lack of appetite/weight loss could be related to dopamine utilization however given his increasing motor difficulties backing off would be quite difficult   Cannot completely exclude Parkinson plus syndromes at this time as well as Parkinson's disease dementia/LBD    Continued weight loss    Plan:     Given his staring spells though unlikely related to seizures will obtain EEG   I do suspect an element of Parkinson's plus    We did discuss possible Northera however would prefer movement disorder specialist evaluate    Will attempt referral to     RAFA Shanks - CNS  7:43 AM  6/19/2025

## 2025-07-01 ENCOUNTER — TELEPHONE (OUTPATIENT)
Age: 65
End: 2025-07-01

## 2025-07-01 NOTE — TELEPHONE ENCOUNTER
Patient stopped by the office to ask who he was referred to at  for a second opinion. He brought in a paper stating he was scheduled 7/2 with Dr. Jozef Singh at [facility], however, he states he was not supposed to be referred to him. He also mentioned a seizure medication but I believe he was waiting to get the 2nd opinion from  first. Please contact patient to discuss further.

## 2025-07-01 NOTE — TELEPHONE ENCOUNTER
LVM relaying to patient again, they are able to cancel appt with [facility] if they are not in network and do not wish to keep appt. As per Gustavo he will not be prescribing medication until workup is completed with  movement disorders, referral was refaxed yesterday per pt request. Any specific details in regards to billing or out of pocket will need to be directed to  or pt insurance.

## 2025-07-22 ENCOUNTER — APPOINTMENT (OUTPATIENT)
Dept: NEUROLOGY | Facility: CLINIC | Age: 65
End: 2025-07-22
Payer: COMMERCIAL

## 2025-07-22 ENCOUNTER — TELEPHONE (OUTPATIENT)
Age: 65
End: 2025-07-22

## 2025-07-22 VITALS
HEART RATE: 70 BPM | DIASTOLIC BLOOD PRESSURE: 80 MMHG | HEIGHT: 72 IN | SYSTOLIC BLOOD PRESSURE: 115 MMHG | BODY MASS INDEX: 25.19 KG/M2 | TEMPERATURE: 97.3 F | WEIGHT: 186 LBS

## 2025-07-22 DIAGNOSIS — G90.3 NEUROGENIC ORTHOSTATIC HYPOTENSION (MULTI): ICD-10-CM

## 2025-07-22 DIAGNOSIS — G20.A2 PARKINSON'S DISEASE WITH FLUCTUATING MANIFESTATIONS, UNSPECIFIED WHETHER DYSKINESIA PRESENT: ICD-10-CM

## 2025-07-22 DIAGNOSIS — G20.A1 PARKINSON'S DISEASE WITHOUT DYSKINESIA OR FLUCTUATING MANIFESTATIONS: Primary | ICD-10-CM

## 2025-07-22 DIAGNOSIS — G47.52 REM SLEEP BEHAVIOR DISORDER: ICD-10-CM

## 2025-07-22 PROCEDURE — 99204 OFFICE O/P NEW MOD 45 MIN: CPT | Performed by: PSYCHIATRY & NEUROLOGY

## 2025-07-22 PROCEDURE — 1036F TOBACCO NON-USER: CPT | Performed by: PSYCHIATRY & NEUROLOGY

## 2025-07-22 PROCEDURE — G8433 SCR FOR DEP NOT CPT DOC RSN: HCPCS | Performed by: PSYCHIATRY & NEUROLOGY

## 2025-07-22 RX ORDER — HYDROCHLOROTHIAZIDE 25 MG/1
1 TABLET ORAL
COMMUNITY
Start: 2025-07-16

## 2025-07-22 RX ORDER — CARBIDOPA AND LEVODOPA 25; 100 MG/1; MG/1
TABLET ORAL
Qty: 720 TABLET | Refills: 3 | Status: SHIPPED | OUTPATIENT
Start: 2025-07-22

## 2025-07-22 RX ORDER — ENTACAPONE 200 MG/1
TABLET ORAL
COMMUNITY

## 2025-07-22 RX ORDER — CARBIDOPA AND LEVODOPA 25; 100 MG/1; MG/1
TABLET, EXTENDED RELEASE ORAL
Qty: 90 TABLET | Refills: 3 | Status: SHIPPED | OUTPATIENT
Start: 2025-07-22

## 2025-07-22 RX ORDER — CITALOPRAM 20 MG/1
20 TABLET ORAL AS NEEDED
COMMUNITY
Start: 2024-06-11

## 2025-07-22 RX ORDER — METOPROLOL TARTRATE 25 MG/1
1 TABLET, FILM COATED ORAL
COMMUNITY
Start: 2025-07-16

## 2025-07-22 RX ORDER — CLONAZEPAM 0.5 MG/1
0.5 TABLET ORAL
COMMUNITY
Start: 2025-04-25

## 2025-07-22 RX ORDER — SIMVASTATIN 40 MG/1
1 TABLET, FILM COATED ORAL
COMMUNITY
Start: 2025-07-09

## 2025-07-22 RX ORDER — ROTIGOTINE 2 MG/24H
1 PATCH, EXTENDED RELEASE TRANSDERMAL DAILY
COMMUNITY
Start: 2025-01-14 | End: 2025-07-22 | Stop reason: WASHOUT

## 2025-07-22 RX ORDER — CARBIDOPA AND LEVODOPA 25; 250 MG/1; MG/1
TABLET ORAL
COMMUNITY
End: 2025-07-22 | Stop reason: WASHOUT

## 2025-07-22 ASSESSMENT — ENCOUNTER SYMPTOMS
LOSS OF SENSATION IN FEET: 1
OCCASIONAL FEELINGS OF UNSTEADINESS: 1
DEPRESSION: 0

## 2025-07-22 ASSESSMENT — PAIN SCALES - GENERAL: PAINLEVEL_OUTOF10: 0-NO PAIN

## 2025-07-22 NOTE — PROGRESS NOTES
Consultation:   Berlin Nicholson is a 64 y.o. year old male is here for consult for Parkinson's disease.     Referred by Robert Lindsay MD  Accompanied by his wife , who is an independent historian.     HPI  They live in [place].   He was acting out dreams in 2018.   He is seeing Domenic Fields CNP and Dr. Bentley at [facility] but was paying too much for a visit so needed to switch providers.    He is taking Sinemet 25-250mg 1 tab QID, just came off Neupro patch 2mg (too costly) ,  also taking Entacapone 200mg QID.   He does drive.  He passed out 2 months ago in a lunchroom at work and then he also fell.    He has starring episodes.   He would get dizzy when walking upstairs.   He still works full time, drives 20 min to work.   He is on hydrochlorothiazide and Lopressor.   He did have a cognitive test.   Tremors began in 2020.   Past meds: mirapex, Ropinirole 4mg ER, Azilect.  Meds take 1 hour to take effect and lasts about 1 hour.     Has had feeling of passing out, lightheadedness. Has had lack of appetite as well.    MRI Brain 2009 unremarkable, another MRI Brain 2025 showed tortous left vertebral artery with mass effect to midline medulla.     No FH of PD.  He had a Concussion during high school football .   Review of Systems    Problem List[1]  Medical History[2]  Surgical History[3]  Social History     Tobacco Use    Smoking status: Not on file    Smokeless tobacco: Not on file   Substance Use Topics    Alcohol use: Not on file     family history is not on file.  Current Medications[4]  Allergies[5]  /80 (BP Location: Right arm, Patient Position: Sitting)   Pulse 70   Temp 36.3 °C (97.3 °F)   Ht 1.829 m (6')   Wt 84.4 kg (186 lb)   BMI 25.23 kg/m²     Neurological Exam/Physical Exam:    Constitutional: General appearance: no acute distress. Pleasant.   Auscultation of Heart: Regular rate and rhythm, no murmurs, normal S1 and S2.   Carotid Arteries: no  bruits  Peripheral Vascular Exam: No swelling in extremities  Mental status: no distress, alert, interactive and cooperative. Affect is appropriate.   Hypomimia.   Fund of knowledge: Patient displays adequate knowledge of current events.  Eyes: The ophthalmoscopic examination was normal.   Cranial nerve II: Visual fields full to confrontation.   Cranial nerves III, IV, and VI: Pupils round, equally reactive to light; EOMs intact. No nystagmus.   Cranial Nerve V: Facial sensation intact to LT bilaterally.   Cranial nerve VII: no facial droop  Cranial nerve VIII: Hearing is intact  Cranial nerves IX and X: Palate elevates symmetrically.   Cranial nerve XI: Shoulder shrug intact.   Cranial nerve XII: Tongue is midline.  Motor:     Strength is normal.  Rocking / swaying. Moderate bradykinesia in all ext, worse on left.  RUE resting tremor is mild to moderate.   Deep Tendon Reflexes: left biceps 2+ , right biceps 2+, left triceps 2+, right triceps 2+, left brachioradialis 2+, right brachioradialis 2+, left patella 2+, right patella 2+, left ankle jerk 2+, right ankle jerk 2+   Sensory Exam: Normal to vibratory sensation  Coordination:  no limb dystaxia   Gait: Shuffling gait, severe postural instability.  Stumbling a lot.         Labs:  Labs were viewed personally.   Kidney function normal.  CBC shows no signs of anemia.  Electrolytes normal.  LFTS are also unremarkable.   B12 624.     Assessment/Plan   Problem List Items Addressed This Visit    None  Visit Diagnoses         Codes      Parkinson's disease without dyskinesia or fluctuating manifestations    -  Primary G20.A1    Relevant Medications    entacapone (Comtan) 200 mg tablet    carbidopa-levodopa (Sinemet)  mg tablet    citalopram (CeleXA) 20 mg tablet    rotigotine (Neupro) 2 mg/24 hour patch      Neurogenic orthostatic hypotension (Multi)     G90.3            This is a chronic neurologic condition that requires ongoing care and monitoring. This is a  complex, serious condition that needs long term care going forward. Between myself and the patient we will be changing direction of care depending on responses to treatment.   Today we discussed medication options, non medication options for management and various other symptoms that are in relation to this disease.  I will continue to be involved in the care of this patient.  Two major goals of treatment are to help with quality of life and daily functioning.  He is in high dose levodopa, ? Responsiveness.   Dizziness, NOH - Needs to do a BP log and consider coming off hydrochlorothiazide and lopressor.  Will need to see his PCP about changing those medications.   Due to dizziness, ? Levodopa induced side effects, will change Sinemet 25-250mg QID to Sinemet 25-100mg 2 tabs QID and add Sinemet 25-100mg ER 1 tab in morning.   Consider stopping Entacapone.   He should try to work remotely if possible due to his gait instability and trouble with moving around however he may need long term disability if unable to perform his job due to his parkinson's symptoms.          [1] There is no problem list on file for this patient.  [2] No past medical history on file.  [3] No past surgical history on file.  [4] No current outpatient medications on file.  [5] No Known Allergies

## 2025-07-22 NOTE — PATIENT INSTRUCTIONS
Island Pedicle Flap With Canthal Suspension Text: The defect edges were debeveled with a #15 scalpel blade.  Given the location of the defect, shape of the defect and the proximity to free margins an island pedicle advancement flap was deemed most appropriate.  Using a sterile surgical marker, an appropriate advancement flap was drawn incorporating the defect, outlining the appropriate donor tissue and placing the expected incisions within the relaxed skin tension lines where possible. The area thus outlined was incised deep to adipose tissue with a #15 scalpel blade.  The skin margins were undermined to an appropriate distance in all directions around the primary defect and laterally outward around the island pedicle utilizing iris scissors.  There was minimal undermining beneath the pedicle flap. A suspension suture was placed in the canthal tendon to prevent tension and prevent ectropion. It was a pleasure seeing you today.     Change Sinemet to 25-100mg 2 tabs four times daily and add Sinemet 25-100mg ER 1 tab in morning.     Hydrate well , 64 ounces daily. Wear compression socks.     Take blood pressure and heart rate laying down , wait 2 min then take this again then sit and wait 2 minutes and take this again then stand and do this again ( 3 separate Blood pressure / heart rate). Record this and whether you are dizzy.    Orthostatic Hypotension in  Parkinson’s Disease:  Essential Facts for Patients  What is orthostatic hypotension and  how common is it in Parkinson’s Disease?  Blood pressure (BP) is one of the most important vital signs. BP  has normal variations. For example, it is often a little higher  during day than at night. BP may also increase during stress.  When people stand up, their BP may drop slightly for a few  seconds. But it usually returns to normal quickly.  When BP doesn’t return to normal quickly after standing up, it is  referred to as orthostatic, or postural, hypotension (OH). This  form of low BP happens in about one third of patients with  Parkinson’s disease (PD). It is less common early in the disease,  but happens more often as the disease progresses.  BP readings have two numbers, for example 120/80 mmHg. The  top number is the systolic BP. That is the highest pressure when  the heart beats and pushes blood through the body. The bottom  number is the diastolic BP. That is the lowest pressure when the  heart relaxes between beats. OH is defined as a drop in the  systolic number of at least 20 mmHg or in the diastolic number  of at least 10 mmHg within 3 minutes after standing.  What are the symptoms of orthostatic  hypotension? Are they dangerous?  People with OH may have a variety of symptoms when they  stand up, including:   Light-headedness   Dizziness   Weakness   Fatigue   Nausea   Blurred vision   Cognitive slowing   Legs buckling   Headache or neck pain  radiating to the shoulders (so-called  coat- pain)  One of the dangers of OH is that it may cause falls. Sometimes  the drop in BP can be severe enough to cause fainting and loss of  consciousness (this is called syncope).  OH may be more common during the following times:   In the early morning   In hot weather   After a meal (particularly big meals)   After drinking alcohol   When urinating or having a bowel movement   During physical exercise  Do PD medications cause orthostatic  hypotension?  Some PD medications may cause this form of low BP or make it  worse. Those medications include levodopa and similar drugs. But  even people who don’t take PD medications may have OH. High  BP medicine and other drugs may also cause this form of low BP.  What can PD patients do to improve  orthostatic hypotension problems?  PD patients may try the following strategies to help relieve  problems with OH, possibly with their caregiver’s help.   Drink more fluids.   Drink 250-500 ml of water quickly over a period of 3-4  minutes. Do this upon waking up if symptoms occur when  getting out of bed or in the morning.   Minimize or avoid drinking alcohol.   Stand up slowly and stand still when feeling dizzy or  lightheaded.   Avoid standing still or laying in a flat position for long periods.   Avoid too much exposure to hot environments, such as hot  baths, saunas, etc.   Elevate the head of the bed when lying down -- try using a  wedge under the head of the bed.   Increase the amount of salt in the diet (if high BP is not a  problem).   Eat smaller, more frequent meals.   Wear elastic compression stockings or abdominal binders. It is  important that compression stockings go all the way up the leg  to the hip or over the abdomen.  Are there medications to treat  orthostatic hypotension in PD ?  The PD patient should review all medications with the doctor.  Certain medications may need to be stopped or cut back.  Several  "medications may be helpful in treating OH in PD patients.  These include fludrocortisone, midodrine, and droxidopa. These  drugs can be used alone or in combination. Doses can be adjusted  to help prevent BP from dropping to very low levels. Care is  needed to be sure the BP does not go too high when lying down.  What should PD patients do when they  have orthostatic hypotension symptoms?  The PD patient should sit or lie down immediately when having  OH symptoms. This should cause symptoms to disappear. Other  things the PD patient can do to overcome postural symptoms, are  shown below.     Please make a follow up appointment in 5-6 months.  You may also schedule a phone or virtual visit sooner on a Friday morning with me as needed before the next clinic appointment.     For any urgent issues or needing to speak to a medical assistant please call [phone], option 6 during our office hours Monday-Friday 8am-4pm, and leave a voicemail with your concern.  My office will try to reach back you as soon as possible within 24 (business) hours.  If you have an emergency please call [phone] or visit a local urgent care or nearest emergency room.      Please understand that [employer] ([employer]) is a useful communication tool for simple \"normal\" results or a refill request but I would not recommend using this tool for emergent or urgent issues or for conversations with me.  I am happy to ask my staff to rearrange a follow up visit or a virtual visit sooner than requested if appropriate for your care.    "

## 2025-07-25 ENCOUNTER — TELEPHONE (OUTPATIENT)
Dept: NEUROLOGY | Facility: CLINIC | Age: 65
End: 2025-07-25
Payer: COMMERCIAL

## 2025-07-30 PROCEDURE — 99285 EMERGENCY DEPT VISIT HI MDM: CPT

## 2025-07-30 PROCEDURE — 93005 ELECTROCARDIOGRAM TRACING: CPT | Performed by: EMERGENCY MEDICINE

## 2025-07-30 PROCEDURE — 83690 ASSAY OF LIPASE: CPT

## 2025-07-30 PROCEDURE — 80053 COMPREHEN METABOLIC PANEL: CPT

## 2025-07-30 PROCEDURE — 84484 ASSAY OF TROPONIN QUANT: CPT

## 2025-07-30 PROCEDURE — 85025 COMPLETE CBC W/AUTO DIFF WBC: CPT

## 2025-07-30 PROCEDURE — 83605 ASSAY OF LACTIC ACID: CPT

## 2025-07-30 RX ORDER — PANTOPRAZOLE SODIUM 40 MG/10ML
40 INJECTION, POWDER, LYOPHILIZED, FOR SOLUTION INTRAVENOUS ONCE
Status: COMPLETED | OUTPATIENT
Start: 2025-07-30 | End: 2025-07-31

## 2025-07-30 ASSESSMENT — PAIN - FUNCTIONAL ASSESSMENT: PAIN_FUNCTIONAL_ASSESSMENT: 0-10

## 2025-07-30 ASSESSMENT — PAIN SCALES - GENERAL: PAINLEVEL_OUTOF10: 6

## 2025-07-30 ASSESSMENT — PAIN DESCRIPTION - LOCATION: LOCATION: ABDOMEN;CHEST;BACK

## 2025-07-31 ENCOUNTER — HOSPITAL ENCOUNTER (EMERGENCY)
Age: 65
Discharge: HOME | End: 2025-07-31
Attending: EMERGENCY MEDICINE
Payer: COMMERCIAL

## 2025-07-31 ENCOUNTER — APPOINTMENT (OUTPATIENT)
Dept: GENERAL RADIOLOGY | Age: 65
End: 2025-07-31
Payer: COMMERCIAL

## 2025-07-31 ENCOUNTER — APPOINTMENT (OUTPATIENT)
Dept: CT IMAGING | Age: 65
End: 2025-07-31
Payer: COMMERCIAL

## 2025-07-31 VITALS
SYSTOLIC BLOOD PRESSURE: 163 MMHG | OXYGEN SATURATION: 99 % | RESPIRATION RATE: 18 BRPM | DIASTOLIC BLOOD PRESSURE: 91 MMHG | WEIGHT: 187 LBS | TEMPERATURE: 98.1 F | HEART RATE: 84 BPM | BODY MASS INDEX: 25.33 KG/M2 | HEIGHT: 72 IN

## 2025-07-31 DIAGNOSIS — R10.10 PAIN OF UPPER ABDOMEN: Primary | ICD-10-CM

## 2025-07-31 LAB
ALBUMIN SERPL-MCNC: 4.1 G/DL (ref 3.5–5.2)
ALP SERPL-CCNC: 84 U/L (ref 40–129)
ALT SERPL-CCNC: 7 U/L (ref 0–50)
ANION GAP SERPL CALCULATED.3IONS-SCNC: 12 MMOL/L (ref 7–16)
AST SERPL-CCNC: 28 U/L (ref 0–50)
BASOPHILS # BLD: 0.02 K/UL (ref 0–0.2)
BASOPHILS NFR BLD: 0 % (ref 0–2)
BILIRUB SERPL-MCNC: 0.5 MG/DL (ref 0–1.2)
BUN SERPL-MCNC: 16 MG/DL (ref 8–23)
CALCIUM SERPL-MCNC: 9.2 MG/DL (ref 8.8–10.2)
CHLORIDE SERPL-SCNC: 104 MMOL/L (ref 98–107)
CO2 SERPL-SCNC: 26 MMOL/L (ref 22–29)
CREAT SERPL-MCNC: 0.9 MG/DL (ref 0.7–1.2)
EOSINOPHIL # BLD: 0.11 K/UL (ref 0.05–0.5)
EOSINOPHILS RELATIVE PERCENT: 2 % (ref 0–6)
ERYTHROCYTE [DISTWIDTH] IN BLOOD BY AUTOMATED COUNT: 12.7 % (ref 11.5–15)
GFR, ESTIMATED: >90 ML/MIN/1.73M2
GLUCOSE SERPL-MCNC: 140 MG/DL (ref 74–99)
HCT VFR BLD AUTO: 41.2 % (ref 37–54)
HGB BLD-MCNC: 13.9 G/DL (ref 12.5–16.5)
IMM GRANULOCYTES # BLD AUTO: <0.03 K/UL (ref 0–0.58)
IMM GRANULOCYTES NFR BLD: 0 % (ref 0–5)
LACTATE BLDV-SCNC: 1.3 MMOL/L (ref 0.5–2.2)
LIPASE SERPL-CCNC: 40 U/L (ref 13–60)
LYMPHOCYTES NFR BLD: 2.15 K/UL (ref 1.5–4)
LYMPHOCYTES RELATIVE PERCENT: 30 % (ref 20–42)
MCH RBC QN AUTO: 28.6 PG (ref 26–35)
MCHC RBC AUTO-ENTMCNC: 33.7 G/DL (ref 32–34.5)
MCV RBC AUTO: 84.8 FL (ref 80–99.9)
MONOCYTES NFR BLD: 0.64 K/UL (ref 0.1–0.95)
MONOCYTES NFR BLD: 9 % (ref 2–12)
NEUTROPHILS NFR BLD: 60 % (ref 43–80)
NEUTS SEG NFR BLD: 4.36 K/UL (ref 1.8–7.3)
PLATELET # BLD AUTO: 159 K/UL (ref 130–450)
PMV BLD AUTO: 10.4 FL (ref 7–12)
POTASSIUM SERPL-SCNC: 3.4 MMOL/L (ref 3.5–5.1)
PROT SERPL-MCNC: 7.5 G/DL (ref 6.4–8.3)
RBC # BLD AUTO: 4.86 M/UL (ref 3.8–5.8)
SODIUM SERPL-SCNC: 141 MMOL/L (ref 136–145)
TROPONIN I SERPL HS-MCNC: 16 NG/L (ref 0–22)
TROPONIN I SERPL HS-MCNC: 21 NG/L (ref 0–22)
WBC OTHER # BLD: 7.3 K/UL (ref 4.5–11.5)

## 2025-07-31 PROCEDURE — 6360000004 HC RX CONTRAST MEDICATION: Performed by: RADIOLOGY

## 2025-07-31 PROCEDURE — 84484 ASSAY OF TROPONIN QUANT: CPT

## 2025-07-31 PROCEDURE — 71045 X-RAY EXAM CHEST 1 VIEW: CPT

## 2025-07-31 PROCEDURE — 6360000002 HC RX W HCPCS: Performed by: EMERGENCY MEDICINE

## 2025-07-31 PROCEDURE — 71275 CT ANGIOGRAPHY CHEST: CPT

## 2025-07-31 PROCEDURE — 74177 CT ABD & PELVIS W/CONTRAST: CPT

## 2025-07-31 PROCEDURE — 96374 THER/PROPH/DIAG INJ IV PUSH: CPT

## 2025-07-31 PROCEDURE — 6370000000 HC RX 637 (ALT 250 FOR IP): Performed by: EMERGENCY MEDICINE

## 2025-07-31 RX ORDER — IOPAMIDOL 755 MG/ML
75 INJECTION, SOLUTION INTRAVASCULAR
Status: COMPLETED | OUTPATIENT
Start: 2025-07-31 | End: 2025-07-31

## 2025-07-31 RX ORDER — PANTOPRAZOLE SODIUM 40 MG/1
40 TABLET, DELAYED RELEASE ORAL DAILY
Qty: 10 TABLET | Refills: 0 | Status: SHIPPED | OUTPATIENT
Start: 2025-07-31 | End: 2025-08-10

## 2025-07-31 RX ADMIN — LIDOCAINE HYDROCHLORIDE: 20 SOLUTION ORAL at 02:14

## 2025-07-31 RX ADMIN — PANTOPRAZOLE SODIUM 40 MG: 40 INJECTION, POWDER, FOR SOLUTION INTRAVENOUS at 00:02

## 2025-07-31 RX ADMIN — IOPAMIDOL 75 ML: 755 INJECTION, SOLUTION INTRAVENOUS at 01:04

## 2025-07-31 ASSESSMENT — PAIN DESCRIPTION - ORIENTATION: ORIENTATION: UPPER;MID

## 2025-07-31 ASSESSMENT — PAIN DESCRIPTION - ONSET: ONSET: ON-GOING

## 2025-07-31 ASSESSMENT — PAIN DESCRIPTION - PAIN TYPE: TYPE: ACUTE PAIN

## 2025-07-31 ASSESSMENT — PAIN SCALES - GENERAL: PAINLEVEL_OUTOF10: 4

## 2025-07-31 ASSESSMENT — PAIN - FUNCTIONAL ASSESSMENT
PAIN_FUNCTIONAL_ASSESSMENT: PREVENTS OR INTERFERES SOME ACTIVE ACTIVITIES AND ADLS
PAIN_FUNCTIONAL_ASSESSMENT: 0-10

## 2025-07-31 ASSESSMENT — PAIN DESCRIPTION - LOCATION: LOCATION: ABDOMEN

## 2025-07-31 ASSESSMENT — PAIN DESCRIPTION - FREQUENCY: FREQUENCY: CONTINUOUS

## 2025-07-31 ASSESSMENT — PAIN DESCRIPTION - DESCRIPTORS: DESCRIPTORS: THROBBING

## 2025-07-31 NOTE — ED PROVIDER NOTES
HPI:  7/30/25, Time: 11:29 PM EDT         Quirino Montalvo is a 64 y.o. male  has a past medical history of Back pain, Hypertension, Parkinson's disease (HCC), and REM behavioral disorder.presenting to the ED for chest pain abdominal pain, beginning 1 hour ago.  The complaint has been persistent, moderate in severity, and worsened by nothing.  Patient presenting here because of chest pain abdominal pain that started 1 hour ago.  Patient reports pain upper abdomen patient reports it radiates to his back does report shortness of breath he states he did feel hot patient 40 no leg pain or swelling.  There is no black or tarry stools or hematemesis reports no fever chills or productive cough     ROS:   Pertinent positives and negatives are stated within HPI, all other systems reviewed and are negative.  --------------------------------------------- PAST HISTORY ---------------------------------------------  Past Medical History:  has a past medical history of Back pain, Hypertension, Parkinson's disease (HCC), and REM behavioral disorder.    Past Surgical History:  has a past surgical history that includes Foot surgery (Bilateral) and Foot surgery (2001).    Social History:  reports that he has never smoked. He has never used smokeless tobacco. He reports that he does not currently use alcohol. He reports that he does not use drugs.    Family History: family history includes Coronary Art Dis in his mother; Heart Attack in his father; Heart Disease in his father; Hypertension in his mother.     The patient’s home medications have been reviewed.    Allergies: Patient has no known allergies.    ---------------------------------------------------PHYSICAL EXAM--------------------------------------    Constitutional/General: Alert and oriented x3, well appearing, non toxic in NAD  Head: Normocephalic and atraumatic  Eyes: PERRL, EOMI  Mouth: Oropharynx clear, handling secretions, no trismus  Neck: Supple, full ROM, non tender

## 2025-08-01 LAB
EKG ATRIAL RATE: 90 BPM
EKG P AXIS: 80 DEGREES
EKG P-R INTERVAL: 262 MS
EKG Q-T INTERVAL: 362 MS
EKG QRS DURATION: 90 MS
EKG QTC CALCULATION (BAZETT): 442 MS
EKG R AXIS: 89 DEGREES
EKG T AXIS: 57 DEGREES
EKG VENTRICULAR RATE: 90 BPM

## 2025-08-01 PROCEDURE — 93010 ELECTROCARDIOGRAM REPORT: CPT | Performed by: INTERNAL MEDICINE

## 2025-08-04 ENCOUNTER — TELEPHONE (OUTPATIENT)
Age: 65
End: 2025-08-04

## 2025-08-22 ENCOUNTER — APPOINTMENT (OUTPATIENT)
Dept: NEUROLOGY | Facility: CLINIC | Age: 65
End: 2025-08-22
Payer: COMMERCIAL

## 2025-08-22 VITALS — BODY MASS INDEX: 24.65 KG/M2 | WEIGHT: 182 LBS | HEIGHT: 72 IN

## 2025-08-22 DIAGNOSIS — G47.52 REM SLEEP BEHAVIOR DISORDER: ICD-10-CM

## 2025-08-22 DIAGNOSIS — G90.3 NEUROGENIC ORTHOSTATIC HYPOTENSION (MULTI): ICD-10-CM

## 2025-08-22 DIAGNOSIS — G20.A2 PARKINSON'S DISEASE WITH FLUCTUATING MANIFESTATIONS, UNSPECIFIED WHETHER DYSKINESIA PRESENT: Primary | ICD-10-CM

## 2025-08-22 RX ORDER — TRIHEXYPHENIDYL HYDROCHLORIDE 2 MG/1
TABLET ORAL
Qty: 270 TABLET | Refills: 3 | Status: SHIPPED | OUTPATIENT
Start: 2025-08-22

## 2025-08-22 ASSESSMENT — ENCOUNTER SYMPTOMS
DEPRESSION: 0
OCCASIONAL FEELINGS OF UNSTEADINESS: 1
LOSS OF SENSATION IN FEET: 1

## 2025-08-22 ASSESSMENT — PAIN SCALES - GENERAL: PAINLEVEL_OUTOF10: 4

## 2025-08-26 ENCOUNTER — HOSPITAL ENCOUNTER (EMERGENCY)
Age: 65
Discharge: HOME | End: 2025-08-26
Payer: COMMERCIAL

## 2025-08-26 VITALS
OXYGEN SATURATION: 98 % | DIASTOLIC BLOOD PRESSURE: 83 MMHG | TEMPERATURE: 97.7 F | HEART RATE: 78 BPM | SYSTOLIC BLOOD PRESSURE: 139 MMHG | RESPIRATION RATE: 18 BRPM
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
## 2025-08-26 PROCEDURE — 99282 EMERGENCY DEPT VISIT SF MDM: CPT
